# Patient Record
Sex: FEMALE | Race: WHITE | NOT HISPANIC OR LATINO
[De-identification: names, ages, dates, MRNs, and addresses within clinical notes are randomized per-mention and may not be internally consistent; named-entity substitution may affect disease eponyms.]

---

## 2017-01-19 ENCOUNTER — APPOINTMENT (OUTPATIENT)
Dept: OTOLARYNGOLOGY | Facility: CLINIC | Age: 57
End: 2017-01-19

## 2017-01-19 VITALS
HEART RATE: 101 BPM | DIASTOLIC BLOOD PRESSURE: 86 MMHG | WEIGHT: 190 LBS | HEIGHT: 66 IN | BODY MASS INDEX: 30.53 KG/M2 | SYSTOLIC BLOOD PRESSURE: 142 MMHG

## 2017-01-19 DIAGNOSIS — K11.20 SIALOADENITIS, UNSPECIFIED: ICD-10-CM

## 2017-02-03 ENCOUNTER — TRANSCRIPTION ENCOUNTER (OUTPATIENT)
Age: 57
End: 2017-02-03

## 2017-02-17 ENCOUNTER — APPOINTMENT (OUTPATIENT)
Dept: OTOLARYNGOLOGY | Facility: CLINIC | Age: 57
End: 2017-02-17

## 2017-02-17 VITALS
HEART RATE: 87 BPM | DIASTOLIC BLOOD PRESSURE: 85 MMHG | WEIGHT: 190 LBS | SYSTOLIC BLOOD PRESSURE: 150 MMHG | BODY MASS INDEX: 30.53 KG/M2 | HEIGHT: 66 IN

## 2017-02-17 DIAGNOSIS — R07.0 PAIN IN THROAT: ICD-10-CM

## 2017-02-17 DIAGNOSIS — H69.83 OTHER SPECIFIED DISORDERS OF EUSTACHIAN TUBE, BILATERAL: ICD-10-CM

## 2017-02-17 DIAGNOSIS — H92.01 OTALGIA, RIGHT EAR: ICD-10-CM

## 2017-02-17 DIAGNOSIS — R42 DIZZINESS AND GIDDINESS: ICD-10-CM

## 2017-02-18 ENCOUNTER — TRANSCRIPTION ENCOUNTER (OUTPATIENT)
Age: 57
End: 2017-02-18

## 2017-03-08 ENCOUNTER — LABORATORY RESULT (OUTPATIENT)
Age: 57
End: 2017-03-08

## 2017-03-10 ENCOUNTER — APPOINTMENT (OUTPATIENT)
Dept: MRI IMAGING | Facility: CLINIC | Age: 57
End: 2017-03-10

## 2017-03-10 ENCOUNTER — OUTPATIENT (OUTPATIENT)
Dept: OUTPATIENT SERVICES | Facility: HOSPITAL | Age: 57
LOS: 1 days | End: 2017-03-10
Payer: COMMERCIAL

## 2017-03-10 DIAGNOSIS — H81.49 VERTIGO OF CENTRAL ORIGIN, UNSPECIFIED EAR: ICD-10-CM

## 2017-03-10 PROCEDURE — 70553 MRI BRAIN STEM W/O & W/DYE: CPT

## 2017-03-10 PROCEDURE — A9585: CPT

## 2017-03-13 ENCOUNTER — TRANSCRIPTION ENCOUNTER (OUTPATIENT)
Age: 57
End: 2017-03-13

## 2017-03-17 ENCOUNTER — APPOINTMENT (OUTPATIENT)
Dept: OTOLARYNGOLOGY | Facility: CLINIC | Age: 57
End: 2017-03-17

## 2017-03-17 ENCOUNTER — TRANSCRIPTION ENCOUNTER (OUTPATIENT)
Age: 57
End: 2017-03-17

## 2017-03-23 ENCOUNTER — TRANSCRIPTION ENCOUNTER (OUTPATIENT)
Age: 57
End: 2017-03-23

## 2017-03-26 ENCOUNTER — FORM ENCOUNTER (OUTPATIENT)
Age: 57
End: 2017-03-26

## 2017-03-27 ENCOUNTER — OUTPATIENT (OUTPATIENT)
Dept: OUTPATIENT SERVICES | Facility: HOSPITAL | Age: 57
LOS: 1 days | End: 2017-03-27
Payer: COMMERCIAL

## 2017-03-27 DIAGNOSIS — J69.1 PNEUMONITIS DUE TO INHALATION OF OILS AND ESSENCES: ICD-10-CM

## 2017-03-27 PROCEDURE — 71020: CPT | Mod: 26

## 2017-03-27 PROCEDURE — 71046 X-RAY EXAM CHEST 2 VIEWS: CPT

## 2017-03-29 ENCOUNTER — OUTPATIENT (OUTPATIENT)
Dept: OUTPATIENT SERVICES | Facility: HOSPITAL | Age: 57
LOS: 1 days | Discharge: HOME | End: 2017-03-29

## 2017-03-29 DIAGNOSIS — N62 HYPERTROPHY OF BREAST: ICD-10-CM

## 2017-03-30 ENCOUNTER — APPOINTMENT (OUTPATIENT)
Dept: HEMATOLOGY ONCOLOGY | Facility: CLINIC | Age: 57
End: 2017-03-30

## 2017-03-30 VITALS
SYSTOLIC BLOOD PRESSURE: 120 MMHG | DIASTOLIC BLOOD PRESSURE: 80 MMHG | TEMPERATURE: 98 F | HEART RATE: 97 BPM | RESPIRATION RATE: 16 BRPM | OXYGEN SATURATION: 98 %

## 2017-03-30 DIAGNOSIS — E55.9 VITAMIN D DEFICIENCY, UNSPECIFIED: ICD-10-CM

## 2017-03-30 RX ORDER — IPRATROPIUM BROMIDE 21 UG/1
0.03 SPRAY NASAL
Qty: 30 | Refills: 0 | Status: DISCONTINUED | COMMUNITY
Start: 2017-02-03

## 2017-03-30 RX ORDER — CEPHALEXIN 500 MG/1
500 CAPSULE ORAL
Qty: 42 | Refills: 0 | Status: DISCONTINUED | COMMUNITY
Start: 2017-01-19

## 2017-03-30 RX ORDER — METHYLPREDNISOLONE 4 MG/1
4 TABLET ORAL
Qty: 1 | Refills: 0 | Status: DISCONTINUED | COMMUNITY
Start: 2017-02-17 | End: 2017-03-30

## 2017-03-30 RX ORDER — CEPHALEXIN 500 MG/1
500 TABLET ORAL 3 TIMES DAILY
Qty: 21 | Refills: 0 | Status: DISCONTINUED | COMMUNITY
Start: 2017-01-19 | End: 2017-03-30

## 2017-03-30 RX ORDER — TOBRAMYCIN AND DEXAMETHASONE 3; 1 MG/ML; MG/ML
0.3-0.1 SUSPENSION/ DROPS OPHTHALMIC
Qty: 5 | Refills: 0 | Status: DISCONTINUED | COMMUNITY
Start: 2017-02-18

## 2017-03-31 DIAGNOSIS — Z91.89 OTHER SPECIFIED PERSONAL RISK FACTORS, NOT ELSEWHERE CLASSIFIED: ICD-10-CM

## 2017-07-03 ENCOUNTER — OUTPATIENT (OUTPATIENT)
Dept: OUTPATIENT SERVICES | Facility: HOSPITAL | Age: 57
LOS: 1 days | End: 2017-07-03
Payer: COMMERCIAL

## 2017-07-03 ENCOUNTER — APPOINTMENT (OUTPATIENT)
Dept: ULTRASOUND IMAGING | Facility: CLINIC | Age: 57
End: 2017-07-03

## 2017-07-03 ENCOUNTER — APPOINTMENT (OUTPATIENT)
Dept: MAMMOGRAPHY | Facility: CLINIC | Age: 57
End: 2017-07-03

## 2017-07-03 DIAGNOSIS — N60.81 OTHER BENIGN MAMMARY DYSPLASIAS OF RIGHT BREAST: ICD-10-CM

## 2017-07-03 DIAGNOSIS — Z80.3 FAMILY HISTORY OF MALIGNANT NEOPLASM OF BREAST: ICD-10-CM

## 2017-07-03 DIAGNOSIS — Z00.8 ENCOUNTER FOR OTHER GENERAL EXAMINATION: ICD-10-CM

## 2017-07-03 PROCEDURE — 77067 SCR MAMMO BI INCL CAD: CPT

## 2017-07-03 PROCEDURE — 77063 BREAST TOMOSYNTHESIS BI: CPT

## 2017-07-03 PROCEDURE — 76641 ULTRASOUND BREAST COMPLETE: CPT

## 2017-08-14 ENCOUNTER — RESULT REVIEW (OUTPATIENT)
Age: 57
End: 2017-08-14

## 2017-09-20 ENCOUNTER — FORM ENCOUNTER (OUTPATIENT)
Age: 57
End: 2017-09-20

## 2017-09-21 ENCOUNTER — OUTPATIENT (OUTPATIENT)
Dept: OUTPATIENT SERVICES | Facility: HOSPITAL | Age: 57
LOS: 1 days | End: 2017-09-21
Payer: COMMERCIAL

## 2017-09-21 ENCOUNTER — OTHER (OUTPATIENT)
Age: 57
End: 2017-09-21

## 2017-09-21 DIAGNOSIS — Z91.89 OTHER SPECIFIED PERSONAL RISK FACTORS, NOT ELSEWHERE CLASSIFIED: ICD-10-CM

## 2017-09-21 PROCEDURE — 93971 EXTREMITY STUDY: CPT

## 2017-09-21 PROCEDURE — 93971 EXTREMITY STUDY: CPT | Mod: 26

## 2017-10-28 ENCOUNTER — TRANSCRIPTION ENCOUNTER (OUTPATIENT)
Age: 57
End: 2017-10-28

## 2017-11-08 ENCOUNTER — TRANSCRIPTION ENCOUNTER (OUTPATIENT)
Age: 57
End: 2017-11-08

## 2017-11-27 ENCOUNTER — APPOINTMENT (OUTPATIENT)
Dept: MRI IMAGING | Facility: CLINIC | Age: 57
End: 2017-11-27
Payer: COMMERCIAL

## 2017-11-27 ENCOUNTER — OUTPATIENT (OUTPATIENT)
Dept: OUTPATIENT SERVICES | Facility: HOSPITAL | Age: 57
LOS: 1 days | End: 2017-11-27
Payer: COMMERCIAL

## 2017-11-27 DIAGNOSIS — Z00.8 ENCOUNTER FOR OTHER GENERAL EXAMINATION: ICD-10-CM

## 2017-11-27 PROCEDURE — 73718 MRI LOWER EXTREMITY W/O DYE: CPT

## 2017-11-27 PROCEDURE — 73718 MRI LOWER EXTREMITY W/O DYE: CPT | Mod: 26,LT

## 2017-12-15 ENCOUNTER — OUTPATIENT (OUTPATIENT)
Dept: OUTPATIENT SERVICES | Facility: HOSPITAL | Age: 57
LOS: 1 days | Discharge: HOME | End: 2017-12-15

## 2017-12-15 DIAGNOSIS — Z91.89 OTHER SPECIFIED PERSONAL RISK FACTORS, NOT ELSEWHERE CLASSIFIED: ICD-10-CM

## 2017-12-18 ENCOUNTER — LABORATORY RESULT (OUTPATIENT)
Age: 57
End: 2017-12-18

## 2017-12-31 ENCOUNTER — TRANSCRIPTION ENCOUNTER (OUTPATIENT)
Age: 57
End: 2017-12-31

## 2018-01-03 ENCOUNTER — OUTPATIENT (OUTPATIENT)
Dept: OUTPATIENT SERVICES | Facility: HOSPITAL | Age: 58
LOS: 1 days | End: 2018-01-03
Payer: COMMERCIAL

## 2018-01-03 ENCOUNTER — APPOINTMENT (OUTPATIENT)
Dept: MRI IMAGING | Facility: CLINIC | Age: 58
End: 2018-01-03
Payer: COMMERCIAL

## 2018-01-03 DIAGNOSIS — Z00.8 ENCOUNTER FOR OTHER GENERAL EXAMINATION: ICD-10-CM

## 2018-01-03 PROCEDURE — C8937: CPT

## 2018-01-03 PROCEDURE — 0159T: CPT | Mod: 26

## 2018-01-03 PROCEDURE — C8908: CPT

## 2018-01-03 PROCEDURE — 77059 MRI BREAST BILATERAL: CPT | Mod: 26

## 2018-01-03 PROCEDURE — A9585: CPT

## 2018-01-30 ENCOUNTER — OUTPATIENT (OUTPATIENT)
Dept: OUTPATIENT SERVICES | Facility: HOSPITAL | Age: 58
LOS: 1 days | Discharge: HOME | End: 2018-01-30

## 2018-01-30 DIAGNOSIS — Z91.89 OTHER SPECIFIED PERSONAL RISK FACTORS, NOT ELSEWHERE CLASSIFIED: ICD-10-CM

## 2018-02-02 ENCOUNTER — APPOINTMENT (OUTPATIENT)
Dept: HEMATOLOGY ONCOLOGY | Facility: CLINIC | Age: 58
End: 2018-02-02
Payer: COMMERCIAL

## 2018-02-02 VITALS
TEMPERATURE: 98.2 F | SYSTOLIC BLOOD PRESSURE: 129 MMHG | OXYGEN SATURATION: 95 % | HEART RATE: 90 BPM | RESPIRATION RATE: 16 BRPM | DIASTOLIC BLOOD PRESSURE: 84 MMHG

## 2018-02-02 PROCEDURE — 99214 OFFICE O/P EST MOD 30 MIN: CPT

## 2018-02-02 RX ORDER — TRIAMCINOLONE ACETONIDE 1 MG/G
0.1 CREAM TOPICAL
Qty: 80 | Refills: 0 | Status: DISCONTINUED | COMMUNITY
Start: 2017-09-05

## 2018-02-02 RX ORDER — MELOXICAM 15 MG/1
15 TABLET ORAL
Qty: 30 | Refills: 0 | Status: DISCONTINUED | COMMUNITY
Start: 2017-12-01

## 2018-02-02 RX ORDER — NYSTATIN 100000 1/G
100000 POWDER TOPICAL
Qty: 15 | Refills: 0 | Status: DISCONTINUED | COMMUNITY
Start: 2018-01-08

## 2018-02-02 RX ORDER — DILTIAZEM HYDROCHLORIDE 120 MG/1
120 CAPSULE, EXTENDED RELEASE ORAL
Qty: 90 | Refills: 0 | Status: DISCONTINUED | COMMUNITY
Start: 2017-03-09

## 2018-02-25 ENCOUNTER — TRANSCRIPTION ENCOUNTER (OUTPATIENT)
Age: 58
End: 2018-02-25

## 2018-07-05 ENCOUNTER — OUTPATIENT (OUTPATIENT)
Dept: OUTPATIENT SERVICES | Facility: HOSPITAL | Age: 58
LOS: 1 days | End: 2018-07-05
Payer: COMMERCIAL

## 2018-07-05 ENCOUNTER — APPOINTMENT (OUTPATIENT)
Dept: MAMMOGRAPHY | Facility: CLINIC | Age: 58
End: 2018-07-05
Payer: COMMERCIAL

## 2018-07-05 ENCOUNTER — APPOINTMENT (OUTPATIENT)
Dept: ULTRASOUND IMAGING | Facility: CLINIC | Age: 58
End: 2018-07-05
Payer: COMMERCIAL

## 2018-07-05 DIAGNOSIS — N60.81 OTHER BENIGN MAMMARY DYSPLASIAS OF RIGHT BREAST: ICD-10-CM

## 2018-07-05 DIAGNOSIS — Z80.3 FAMILY HISTORY OF MALIGNANT NEOPLASM OF BREAST: ICD-10-CM

## 2018-07-05 PROCEDURE — 76641 ULTRASOUND BREAST COMPLETE: CPT | Mod: 26,50

## 2018-07-05 PROCEDURE — 77063 BREAST TOMOSYNTHESIS BI: CPT

## 2018-07-05 PROCEDURE — 77067 SCR MAMMO BI INCL CAD: CPT

## 2018-07-05 PROCEDURE — 76641 ULTRASOUND BREAST COMPLETE: CPT

## 2018-07-05 PROCEDURE — 77067 SCR MAMMO BI INCL CAD: CPT | Mod: 26

## 2018-07-05 PROCEDURE — 77063 BREAST TOMOSYNTHESIS BI: CPT | Mod: 26

## 2018-07-25 ENCOUNTER — RX RENEWAL (OUTPATIENT)
Age: 58
End: 2018-07-25

## 2018-08-21 ENCOUNTER — RESULT REVIEW (OUTPATIENT)
Age: 58
End: 2018-08-21

## 2018-08-31 ENCOUNTER — APPOINTMENT (OUTPATIENT)
Dept: DERMATOLOGY | Facility: CLINIC | Age: 58
End: 2018-08-31

## 2018-10-08 ENCOUNTER — OUTPATIENT (OUTPATIENT)
Dept: OUTPATIENT SERVICES | Facility: HOSPITAL | Age: 58
LOS: 1 days | End: 2018-10-08
Payer: COMMERCIAL

## 2018-10-08 VITALS
HEART RATE: 71 BPM | RESPIRATION RATE: 14 BRPM | SYSTOLIC BLOOD PRESSURE: 134 MMHG | TEMPERATURE: 97 F | OXYGEN SATURATION: 98 % | HEIGHT: 66 IN | DIASTOLIC BLOOD PRESSURE: 85 MMHG | WEIGHT: 184.09 LBS

## 2018-10-08 DIAGNOSIS — Z80.3 FAMILY HISTORY OF MALIGNANT NEOPLASM OF BREAST: ICD-10-CM

## 2018-10-08 DIAGNOSIS — Z98.890 OTHER SPECIFIED POSTPROCEDURAL STATES: Chronic | ICD-10-CM

## 2018-10-08 DIAGNOSIS — M67.442 GANGLION, LEFT HAND: ICD-10-CM

## 2018-10-08 DIAGNOSIS — Z87.59 PERSONAL HISTORY OF OTHER COMPLICATIONS OF PREGNANCY, CHILDBIRTH AND THE PUERPERIUM: Chronic | ICD-10-CM

## 2018-10-08 DIAGNOSIS — Z98.891 HISTORY OF UTERINE SCAR FROM PREVIOUS SURGERY: Chronic | ICD-10-CM

## 2018-10-08 DIAGNOSIS — Z01.818 ENCOUNTER FOR OTHER PREPROCEDURAL EXAMINATION: ICD-10-CM

## 2018-10-08 DIAGNOSIS — E03.9 HYPOTHYROIDISM, UNSPECIFIED: ICD-10-CM

## 2018-10-08 DIAGNOSIS — M15.1 HEBERDEN'S NODES (WITH ARTHROPATHY): ICD-10-CM

## 2018-10-08 LAB
HCT VFR BLD CALC: 43.5 % — SIGNIFICANT CHANGE UP (ref 34.5–45)
HCV AB S/CO SERPL IA: 0.14 S/CO — SIGNIFICANT CHANGE UP
HCV AB SERPL-IMP: SIGNIFICANT CHANGE UP
HGB BLD-MCNC: 13.5 G/DL — SIGNIFICANT CHANGE UP (ref 11.5–15.5)
MCHC RBC-ENTMCNC: 27.1 PG — SIGNIFICANT CHANGE UP (ref 27–34)
MCHC RBC-ENTMCNC: 31 GM/DL — LOW (ref 32–36)
MCV RBC AUTO: 87.3 FL — SIGNIFICANT CHANGE UP (ref 80–100)
PLATELET # BLD AUTO: 249 K/UL — SIGNIFICANT CHANGE UP (ref 150–400)
RBC # BLD: 4.98 M/UL — SIGNIFICANT CHANGE UP (ref 3.8–5.2)
RBC # FLD: 13.5 % — SIGNIFICANT CHANGE UP (ref 10.3–14.5)
WBC # BLD: 7.16 K/UL — SIGNIFICANT CHANGE UP (ref 3.8–10.5)
WBC # FLD AUTO: 7.16 K/UL — SIGNIFICANT CHANGE UP (ref 3.8–10.5)

## 2018-10-08 PROCEDURE — 85027 COMPLETE CBC AUTOMATED: CPT

## 2018-10-08 PROCEDURE — 86803 HEPATITIS C AB TEST: CPT

## 2018-10-08 PROCEDURE — G0463: CPT

## 2018-10-08 RX ORDER — LIDOCAINE HCL 20 MG/ML
0.2 VIAL (ML) INJECTION ONCE
Qty: 0 | Refills: 0 | Status: DISCONTINUED | OUTPATIENT
Start: 2018-10-10 | End: 2018-10-25

## 2018-10-08 RX ORDER — SODIUM CHLORIDE 9 MG/ML
3 INJECTION INTRAMUSCULAR; INTRAVENOUS; SUBCUTANEOUS EVERY 8 HOURS
Qty: 0 | Refills: 0 | Status: DISCONTINUED | OUTPATIENT
Start: 2018-10-10 | End: 2018-10-25

## 2018-10-08 NOTE — H&P PST ADULT - HISTORY OF PRESENT ILLNESS
Ms. 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

## 2018-10-08 NOTE — H&P PST ADULT - ATTENDING COMMENTS
The patient is admitted today for excision of left III mucous cyst with DIP debridement.  I have reviewed the procedure in detail again today with the patient.  The numerous risks, benefits, alternatives, possible complications and expectations are reviewed.  All questions have been thoroughly answered and the patient has verbalized understanding of all of the above and gives consent to proceed

## 2018-10-08 NOTE — H&P PST ADULT - PMH
Family history of breast cancer in first degree relative    Hypothyroidism, unspecified type    Palpitations  when she was started on raloxifene years ago

## 2018-10-08 NOTE — H&P PST ADULT - NSANTHOSAYNRD_GEN_A_CORE
No. GARRETT screening performed.  STOP BANG Legend: 0-2 = LOW Risk; 3-4 = INTERMEDIATE Risk; 5-8 = HIGH Risk

## 2018-10-08 NOTE — H&P PST ADULT - PROBLEM SELECTOR PLAN 1
Scheduled for excision of left middle finger mucous cyst, distal interphalangeal joint debridement, arthroscopy.

## 2018-10-09 ENCOUNTER — TRANSCRIPTION ENCOUNTER (OUTPATIENT)
Age: 58
End: 2018-10-09

## 2018-10-09 RX ORDER — ONDANSETRON 8 MG/1
4 TABLET, FILM COATED ORAL ONCE
Qty: 0 | Refills: 0 | Status: DISCONTINUED | OUTPATIENT
Start: 2018-10-10 | End: 2018-10-25

## 2018-10-09 RX ORDER — SODIUM CHLORIDE 9 MG/ML
1000 INJECTION, SOLUTION INTRAVENOUS
Qty: 0 | Refills: 0 | Status: DISCONTINUED | OUTPATIENT
Start: 2018-10-10 | End: 2018-10-25

## 2018-10-09 RX ORDER — CELECOXIB 200 MG/1
200 CAPSULE ORAL ONCE
Qty: 0 | Refills: 0 | Status: DISCONTINUED | OUTPATIENT
Start: 2018-10-10 | End: 2018-10-25

## 2018-10-10 ENCOUNTER — RESULT REVIEW (OUTPATIENT)
Age: 58
End: 2018-10-10

## 2018-10-10 ENCOUNTER — OUTPATIENT (OUTPATIENT)
Dept: OUTPATIENT SERVICES | Facility: HOSPITAL | Age: 58
LOS: 1 days | End: 2018-10-10
Payer: COMMERCIAL

## 2018-10-10 VITALS
HEART RATE: 70 BPM | RESPIRATION RATE: 16 BRPM | SYSTOLIC BLOOD PRESSURE: 133 MMHG | DIASTOLIC BLOOD PRESSURE: 78 MMHG | OXYGEN SATURATION: 98 %

## 2018-10-10 VITALS
WEIGHT: 184.09 LBS | RESPIRATION RATE: 14 BRPM | HEIGHT: 66 IN | SYSTOLIC BLOOD PRESSURE: 146 MMHG | TEMPERATURE: 99 F | HEART RATE: 83 BPM | DIASTOLIC BLOOD PRESSURE: 85 MMHG | OXYGEN SATURATION: 98 %

## 2018-10-10 DIAGNOSIS — Z98.891 HISTORY OF UTERINE SCAR FROM PREVIOUS SURGERY: Chronic | ICD-10-CM

## 2018-10-10 DIAGNOSIS — M67.442 GANGLION, LEFT HAND: ICD-10-CM

## 2018-10-10 DIAGNOSIS — Z98.890 OTHER SPECIFIED POSTPROCEDURAL STATES: Chronic | ICD-10-CM

## 2018-10-10 DIAGNOSIS — M15.1 HEBERDEN'S NODES (WITH ARTHROPATHY): ICD-10-CM

## 2018-10-10 DIAGNOSIS — Z87.59 PERSONAL HISTORY OF OTHER COMPLICATIONS OF PREGNANCY, CHILDBIRTH AND THE PUERPERIUM: Chronic | ICD-10-CM

## 2018-10-10 PROCEDURE — 88304 TISSUE EXAM BY PATHOLOGIST: CPT

## 2018-10-10 PROCEDURE — 88304 TISSUE EXAM BY PATHOLOGIST: CPT | Mod: 26

## 2018-10-10 PROCEDURE — 26535 REVISE FINGER JOINT: CPT | Mod: F2

## 2018-10-10 RX ORDER — CELECOXIB 200 MG/1
200 CAPSULE ORAL ONCE
Qty: 0 | Refills: 0 | Status: COMPLETED | OUTPATIENT
Start: 2018-10-10 | End: 2018-10-10

## 2018-10-10 RX ORDER — ACETAMINOPHEN 500 MG
1000 TABLET ORAL ONCE
Qty: 0 | Refills: 0 | Status: COMPLETED | OUTPATIENT
Start: 2018-10-10 | End: 2018-10-10

## 2018-10-10 RX ADMIN — CELECOXIB 200 MILLIGRAM(S): 200 CAPSULE ORAL at 11:00

## 2018-10-10 RX ADMIN — Medication 1000 MILLIGRAM(S): at 11:00

## 2018-10-10 NOTE — BRIEF OPERATIVE NOTE - PRE-OP DX
Mucous cyst of digit of left hand  10/10/2018    Active  Rachele Courtney  Osteophyte of hand, unspecified laterality  10/10/2018    Active  Rachele Courtney

## 2018-10-10 NOTE — ASU DISCHARGE PLAN (ADULT/PEDIATRIC). - ITEMS TO FOLLOWUP WITH YOUR PHYSICIAN'S
Please follow up with Dr. Fatima within 1-2 weeks after discharge from the hospital. You may call [phone] to schedule an appointment.

## 2018-10-10 NOTE — ASU DISCHARGE PLAN (ADULT/PEDIATRIC). - SPECIAL INSTRUCTIONS
Please dressing in place until Saturday; then you may remove the dressing and replace with a bandaid.  Keep hand dry while bathing; you may place it in a plastic bag to keep it from getting wet while showering.  Keep hand elevated to reduce swelling.  Take tylenol, as needed, for pain,

## 2018-10-10 NOTE — BRIEF OPERATIVE NOTE - PROCEDURE
<<-----Click on this checkbox to enter Procedure Debridement of joint  10/10/2018    Active  DBREGMAN  Excision of mucous cyst  10/10/2018    Active  DBREGMAN

## 2018-10-10 NOTE — ASU DISCHARGE PLAN (ADULT/PEDIATRIC). - MEDICATION SUMMARY - MEDICATIONS TO TAKE
I will START or STAY ON the medications listed below when I get home from the hospital:    acetaminophen 325 mg oral tablet  -- 2 tab(s) by mouth every 4 hours, As Needed - for moderate pain  -- Indication: For For pain    Cardizem 120 mg oral tablet  -- 1 tab(s) by mouth once a day  -- Indication: For Home medication    raloxifene 60 mg oral tablet  -- 1 tab(s) by mouth once a day  -- Indication: For Home medication    Synthroid 150 mcg (0.15 mg) oral tablet  -- 1 tab(s) by mouth once a day  -- Indication: For Home medication

## 2018-10-10 NOTE — BRIEF OPERATIVE NOTE - POST-OP DX
Mucous cyst of digit of left hand  10/10/2018    Active  Rachele Courtney  Osteophyte of left hand  10/10/2018    Active  Rachele Courtney

## 2018-10-17 LAB — SURGICAL PATHOLOGY STUDY: SIGNIFICANT CHANGE UP

## 2018-10-18 PROBLEM — R00.2 PALPITATIONS: Chronic | Status: ACTIVE | Noted: 2018-10-08

## 2018-10-18 PROBLEM — E03.9 HYPOTHYROIDISM, UNSPECIFIED: Chronic | Status: ACTIVE | Noted: 2018-10-08

## 2018-10-18 PROBLEM — Z80.3 FAMILY HISTORY OF MALIGNANT NEOPLASM OF BREAST: Chronic | Status: ACTIVE | Noted: 2018-10-08

## 2018-10-31 ENCOUNTER — NON-APPOINTMENT (OUTPATIENT)
Age: 58
End: 2018-10-31

## 2018-10-31 ENCOUNTER — APPOINTMENT (OUTPATIENT)
Dept: CARDIOLOGY | Facility: CLINIC | Age: 58
End: 2018-10-31
Payer: COMMERCIAL

## 2018-10-31 VITALS
WEIGHT: 181 LBS | BODY MASS INDEX: 29.09 KG/M2 | HEART RATE: 77 BPM | HEIGHT: 66 IN | SYSTOLIC BLOOD PRESSURE: 129 MMHG | OXYGEN SATURATION: 96 % | DIASTOLIC BLOOD PRESSURE: 84 MMHG

## 2018-10-31 PROCEDURE — 99244 OFF/OP CNSLTJ NEW/EST MOD 40: CPT | Mod: 25

## 2018-10-31 PROCEDURE — 99401 PREV MED CNSL INDIV APPRX 15: CPT

## 2018-10-31 PROCEDURE — 93025 MICROVOLT T-WAVE ASSESS: CPT

## 2018-11-01 LAB
ALBUMIN SERPL ELPH-MCNC: 4.3 G/DL
ALP BLD-CCNC: 86 U/L
ALT SERPL-CCNC: 18 U/L
ANION GAP SERPL CALC-SCNC: 14 MMOL/L
AST SERPL-CCNC: 22 U/L
BASOPHILS # BLD AUTO: 0.03 K/UL
BASOPHILS NFR BLD AUTO: 0.4 %
BILIRUB SERPL-MCNC: 0.5 MG/DL
BUN SERPL-MCNC: 9 MG/DL
CALCIUM SERPL-MCNC: 9.5 MG/DL
CHLORIDE SERPL-SCNC: 101 MMOL/L
CHOLEST SERPL-MCNC: 208 MG/DL
CHOLEST/HDLC SERPL: 2.8 RATIO
CO2 SERPL-SCNC: 26 MMOL/L
CREAT SERPL-MCNC: 0.54 MG/DL
EOSINOPHIL # BLD AUTO: 0.17 K/UL
EOSINOPHIL NFR BLD AUTO: 2.4 %
GLUCOSE SERPL-MCNC: 91 MG/DL
HCT VFR BLD CALC: 42.6 %
HDLC SERPL-MCNC: 75 MG/DL
HGB BLD-MCNC: 13.7 G/DL
IMM GRANULOCYTES NFR BLD AUTO: 0.1 %
LDLC SERPL CALC-MCNC: 110 MG/DL
LYMPHOCYTES # BLD AUTO: 2.87 K/UL
LYMPHOCYTES NFR BLD AUTO: 41 %
MAN DIFF?: NORMAL
MCHC RBC-ENTMCNC: 27 PG
MCHC RBC-ENTMCNC: 32.2 GM/DL
MCV RBC AUTO: 83.9 FL
MONOCYTES # BLD AUTO: 0.57 K/UL
MONOCYTES NFR BLD AUTO: 8.1 %
NEUTROPHILS # BLD AUTO: 3.35 K/UL
NEUTROPHILS NFR BLD AUTO: 48 %
PLATELET # BLD AUTO: 240 K/UL
POTASSIUM SERPL-SCNC: 4.5 MMOL/L
PROT SERPL-MCNC: 7.2 G/DL
RBC # BLD: 5.08 M/UL
RBC # FLD: 13.3 %
SODIUM SERPL-SCNC: 141 MMOL/L
TRIGL SERPL-MCNC: 114 MG/DL
TSH SERPL-ACNC: 0.12 UIU/ML
WBC # FLD AUTO: 7 K/UL

## 2018-11-04 ENCOUNTER — TRANSCRIPTION ENCOUNTER (OUTPATIENT)
Age: 58
End: 2018-11-04

## 2018-12-21 ENCOUNTER — APPOINTMENT (OUTPATIENT)
Dept: MRI IMAGING | Facility: IMAGING CENTER | Age: 58
End: 2018-12-21
Payer: COMMERCIAL

## 2018-12-21 ENCOUNTER — OUTPATIENT (OUTPATIENT)
Dept: OUTPATIENT SERVICES | Facility: HOSPITAL | Age: 58
LOS: 1 days | End: 2018-12-21
Payer: COMMERCIAL

## 2018-12-21 DIAGNOSIS — Z98.890 OTHER SPECIFIED POSTPROCEDURAL STATES: Chronic | ICD-10-CM

## 2018-12-21 DIAGNOSIS — Z00.8 ENCOUNTER FOR OTHER GENERAL EXAMINATION: ICD-10-CM

## 2018-12-21 DIAGNOSIS — Z98.891 HISTORY OF UTERINE SCAR FROM PREVIOUS SURGERY: Chronic | ICD-10-CM

## 2018-12-21 DIAGNOSIS — Z87.59 PERSONAL HISTORY OF OTHER COMPLICATIONS OF PREGNANCY, CHILDBIRTH AND THE PUERPERIUM: Chronic | ICD-10-CM

## 2018-12-21 PROCEDURE — 77059 MRI BREAST BILATERAL: CPT | Mod: 26

## 2018-12-21 PROCEDURE — 0159T: CPT | Mod: 26

## 2018-12-21 PROCEDURE — C8908: CPT

## 2018-12-21 PROCEDURE — A9585: CPT

## 2018-12-21 PROCEDURE — C8937: CPT

## 2018-12-21 PROCEDURE — 82565 ASSAY OF CREATININE: CPT

## 2019-02-01 ENCOUNTER — APPOINTMENT (OUTPATIENT)
Dept: SURGICAL ONCOLOGY | Facility: CLINIC | Age: 59
End: 2019-02-01
Payer: COMMERCIAL

## 2019-02-01 VITALS
HEART RATE: 78 BPM | OXYGEN SATURATION: 97 % | HEIGHT: 66 IN | SYSTOLIC BLOOD PRESSURE: 144 MMHG | WEIGHT: 178 LBS | DIASTOLIC BLOOD PRESSURE: 86 MMHG | BODY MASS INDEX: 28.61 KG/M2

## 2019-02-01 PROCEDURE — 99243 OFF/OP CNSLTJ NEW/EST LOW 30: CPT

## 2019-02-01 NOTE — CONSULT LETTER
[Dear  ___] : Dear  [unfilled], [Consult Letter:] : I had the pleasure of evaluating your patient, [unfilled]. [Please see my note below.] : Please see my note below. [Consult Closing:] : Thank you very much for allowing me to participate in the care of this patient.  If you have any questions, please do not hesitate to contact me. [Sincerely,] : Sincerely, [FreeTextEntry2] : [address]\[place] [address] [FreeTextEntry3] : Huseyin Catalan M.D.\par \par \par \par  - Singulair 10 mg daily - c/w home Protonix 40 mg PO daily

## 2019-02-01 NOTE — HISTORY OF PRESENT ILLNESS
[de-identified] : 59 y/o female patient presents for an initial consultation. She denies any palpable masses and nipple discharge.\par FMHx of recurrent Breast CA from mother.\par PMHx of breast disease includes LT breast excision and RT breast ALH.\par Pt has no gene mutation. \par She has a hx of HTN controlled with medication and hypothyroidism.\par NKDA\par \par MRI 02/01/19: No MRI evidence of malignancy. BIRADS 2.

## 2019-02-01 NOTE — PHYSICAL EXAM
[Normal] : supple, no neck mass and thyroid not enlarged [Normal Neck Lymph Nodes] : normal neck lymph nodes  [Normal Groin Lymph Nodes] : normal groin lymph nodes [Normal] : oriented to person, place and time, with appropriate affect [FreeTextEntry1] : I, Julio Judd was present for the physical exam\par \par \par \par  [de-identified] : Normal S1, S2. Regular rate and rhythm\par \par  [de-identified] : Complete normal breast examination performed supine and upright revealed no palpable masses, nipple discharge, inversion, deviation or enlarge axillary lymph nodes, or supraclavicular lymph nodes [de-identified] : Clear breath sounds bilaterally, normal respiratory effort\par \par

## 2019-02-01 NOTE — ASSESSMENT
[FreeTextEntry1] : imp:\par High risk patient who needs yearly surveillance.\par No evidence of new or recurrent lesions. Breast imaging failed to identify any suspicious lesions. No suspicious lesions on exam\par plan:\par Continue yearly surveillance.\par \par \par

## 2019-02-05 ENCOUNTER — RX RENEWAL (OUTPATIENT)
Age: 59
End: 2019-02-05

## 2019-03-01 ENCOUNTER — TRANSCRIPTION ENCOUNTER (OUTPATIENT)
Age: 59
End: 2019-03-01

## 2019-03-21 ENCOUNTER — TRANSCRIPTION ENCOUNTER (OUTPATIENT)
Age: 59
End: 2019-03-21

## 2019-04-17 ENCOUNTER — APPOINTMENT (OUTPATIENT)
Dept: CARDIOLOGY | Facility: CLINIC | Age: 59
End: 2019-04-17
Payer: COMMERCIAL

## 2019-04-17 ENCOUNTER — NON-APPOINTMENT (OUTPATIENT)
Age: 59
End: 2019-04-17

## 2019-04-17 VITALS
SYSTOLIC BLOOD PRESSURE: 136 MMHG | HEIGHT: 66 IN | WEIGHT: 165 LBS | DIASTOLIC BLOOD PRESSURE: 86 MMHG | BODY MASS INDEX: 26.52 KG/M2 | OXYGEN SATURATION: 96 % | HEART RATE: 74 BPM

## 2019-04-17 LAB
ALBUMIN SERPL ELPH-MCNC: 4.4 G/DL
ALP BLD-CCNC: 83 U/L
ALT SERPL-CCNC: 16 U/L
ANION GAP SERPL CALC-SCNC: 14 MMOL/L
AST SERPL-CCNC: 17 U/L
BASOPHILS # BLD AUTO: 0.05 K/UL
BASOPHILS NFR BLD AUTO: 0.6 %
BILIRUB SERPL-MCNC: 0.4 MG/DL
BUN SERPL-MCNC: 13 MG/DL
CALCIUM SERPL-MCNC: 9.2 MG/DL
CHLORIDE SERPL-SCNC: 101 MMOL/L
CHOLEST SERPL-MCNC: 218 MG/DL
CHOLEST/HDLC SERPL: 2.5 RATIO
CO2 SERPL-SCNC: 25 MMOL/L
CREAT SERPL-MCNC: 0.6 MG/DL
EOSINOPHIL # BLD AUTO: 0.28 K/UL
EOSINOPHIL NFR BLD AUTO: 3.6 %
ESTIMATED AVERAGE GLUCOSE: 111 MG/DL
GLUCOSE SERPL-MCNC: 91 MG/DL
HBA1C MFR BLD HPLC: 5.5 %
HCT VFR BLD CALC: 43.6 %
HDLC SERPL-MCNC: 89 MG/DL
HGB BLD-MCNC: 13.9 G/DL
IMM GRANULOCYTES NFR BLD AUTO: 0.1 %
LDLC SERPL CALC-MCNC: 112 MG/DL
LYMPHOCYTES # BLD AUTO: 2.63 K/UL
LYMPHOCYTES NFR BLD AUTO: 33.7 %
MAN DIFF?: NORMAL
MCHC RBC-ENTMCNC: 27.6 PG
MCHC RBC-ENTMCNC: 31.9 GM/DL
MCV RBC AUTO: 86.5 FL
MONOCYTES # BLD AUTO: 0.54 K/UL
MONOCYTES NFR BLD AUTO: 6.9 %
NEUTROPHILS # BLD AUTO: 4.3 K/UL
NEUTROPHILS NFR BLD AUTO: 55.1 %
PLATELET # BLD AUTO: 265 K/UL
POTASSIUM SERPL-SCNC: 4.4 MMOL/L
PROT SERPL-MCNC: 6.9 G/DL
RBC # BLD: 5.04 M/UL
RBC # FLD: 13 %
SODIUM SERPL-SCNC: 140 MMOL/L
TRIGL SERPL-MCNC: 87 MG/DL
TSH SERPL-ACNC: 0.22 UIU/ML
WBC # FLD AUTO: 7.81 K/UL

## 2019-04-17 PROCEDURE — 99402 PREV MED CNSL INDIV APPRX 30: CPT

## 2019-04-17 PROCEDURE — 99214 OFFICE O/P EST MOD 30 MIN: CPT | Mod: 25

## 2019-04-17 PROCEDURE — 93000 ELECTROCARDIOGRAM COMPLETE: CPT

## 2019-04-22 ENCOUNTER — OUTPATIENT (OUTPATIENT)
Dept: OUTPATIENT SERVICES | Facility: HOSPITAL | Age: 59
LOS: 1 days | Discharge: HOME | End: 2019-04-22

## 2019-04-22 DIAGNOSIS — Z91.89 OTHER SPECIFIED PERSONAL RISK FACTORS, NOT ELSEWHERE CLASSIFIED: ICD-10-CM

## 2019-04-22 DIAGNOSIS — Z87.59 PERSONAL HISTORY OF OTHER COMPLICATIONS OF PREGNANCY, CHILDBIRTH AND THE PUERPERIUM: Chronic | ICD-10-CM

## 2019-04-22 DIAGNOSIS — Z98.890 OTHER SPECIFIED POSTPROCEDURAL STATES: Chronic | ICD-10-CM

## 2019-04-22 DIAGNOSIS — Z98.891 HISTORY OF UTERINE SCAR FROM PREVIOUS SURGERY: Chronic | ICD-10-CM

## 2019-04-26 ENCOUNTER — APPOINTMENT (OUTPATIENT)
Dept: HEMATOLOGY ONCOLOGY | Facility: CLINIC | Age: 59
End: 2019-04-26
Payer: COMMERCIAL

## 2019-04-26 VITALS
RESPIRATION RATE: 16 BRPM | TEMPERATURE: 97.4 F | DIASTOLIC BLOOD PRESSURE: 83 MMHG | SYSTOLIC BLOOD PRESSURE: 134 MMHG | HEART RATE: 76 BPM | WEIGHT: 164 LBS | OXYGEN SATURATION: 97 % | BODY MASS INDEX: 26.47 KG/M2

## 2019-04-26 DIAGNOSIS — E61.1 IRON DEFICIENCY: ICD-10-CM

## 2019-04-26 PROCEDURE — 99214 OFFICE O/P EST MOD 30 MIN: CPT

## 2019-05-18 ENCOUNTER — RX RENEWAL (OUTPATIENT)
Age: 59
End: 2019-05-18

## 2019-07-11 ENCOUNTER — FORM ENCOUNTER (OUTPATIENT)
Age: 59
End: 2019-07-11

## 2019-07-12 ENCOUNTER — OUTPATIENT (OUTPATIENT)
Dept: OUTPATIENT SERVICES | Facility: HOSPITAL | Age: 59
LOS: 1 days | End: 2019-07-12
Payer: COMMERCIAL

## 2019-07-12 ENCOUNTER — APPOINTMENT (OUTPATIENT)
Dept: MAMMOGRAPHY | Facility: IMAGING CENTER | Age: 59
End: 2019-07-12
Payer: COMMERCIAL

## 2019-07-12 ENCOUNTER — APPOINTMENT (OUTPATIENT)
Dept: ULTRASOUND IMAGING | Facility: IMAGING CENTER | Age: 59
End: 2019-07-12
Payer: COMMERCIAL

## 2019-07-12 DIAGNOSIS — Z98.890 OTHER SPECIFIED POSTPROCEDURAL STATES: Chronic | ICD-10-CM

## 2019-07-12 DIAGNOSIS — Z98.891 HISTORY OF UTERINE SCAR FROM PREVIOUS SURGERY: Chronic | ICD-10-CM

## 2019-07-12 DIAGNOSIS — N60.99 UNSPECIFIED BENIGN MAMMARY DYSPLASIA OF UNSPECIFIED BREAST: ICD-10-CM

## 2019-07-12 DIAGNOSIS — Z87.59 PERSONAL HISTORY OF OTHER COMPLICATIONS OF PREGNANCY, CHILDBIRTH AND THE PUERPERIUM: Chronic | ICD-10-CM

## 2019-07-12 PROCEDURE — 76641 ULTRASOUND BREAST COMPLETE: CPT | Mod: 26,50

## 2019-07-12 PROCEDURE — 77063 BREAST TOMOSYNTHESIS BI: CPT

## 2019-07-12 PROCEDURE — 77063 BREAST TOMOSYNTHESIS BI: CPT | Mod: 26

## 2019-07-12 PROCEDURE — 77067 SCR MAMMO BI INCL CAD: CPT | Mod: 26

## 2019-07-12 PROCEDURE — 77067 SCR MAMMO BI INCL CAD: CPT

## 2019-07-12 PROCEDURE — 76641 ULTRASOUND BREAST COMPLETE: CPT

## 2019-07-15 ENCOUNTER — FORM ENCOUNTER (OUTPATIENT)
Age: 59
End: 2019-07-15

## 2019-07-16 ENCOUNTER — APPOINTMENT (OUTPATIENT)
Dept: ULTRASOUND IMAGING | Facility: IMAGING CENTER | Age: 59
End: 2019-07-16
Payer: COMMERCIAL

## 2019-07-16 ENCOUNTER — APPOINTMENT (OUTPATIENT)
Dept: MAMMOGRAPHY | Facility: IMAGING CENTER | Age: 59
End: 2019-07-16
Payer: COMMERCIAL

## 2019-07-16 ENCOUNTER — OUTPATIENT (OUTPATIENT)
Dept: OUTPATIENT SERVICES | Facility: HOSPITAL | Age: 59
LOS: 1 days | End: 2019-07-16
Payer: COMMERCIAL

## 2019-07-16 DIAGNOSIS — Z00.8 ENCOUNTER FOR OTHER GENERAL EXAMINATION: ICD-10-CM

## 2019-07-16 DIAGNOSIS — Z98.890 OTHER SPECIFIED POSTPROCEDURAL STATES: Chronic | ICD-10-CM

## 2019-07-16 DIAGNOSIS — Z98.891 HISTORY OF UTERINE SCAR FROM PREVIOUS SURGERY: Chronic | ICD-10-CM

## 2019-07-16 DIAGNOSIS — Z87.59 PERSONAL HISTORY OF OTHER COMPLICATIONS OF PREGNANCY, CHILDBIRTH AND THE PUERPERIUM: Chronic | ICD-10-CM

## 2019-07-16 PROCEDURE — 77065 DX MAMMO INCL CAD UNI: CPT | Mod: 26,LT

## 2019-07-16 PROCEDURE — G0279: CPT | Mod: 26

## 2019-07-16 PROCEDURE — 76642 ULTRASOUND BREAST LIMITED: CPT | Mod: 26,50

## 2019-07-16 PROCEDURE — 77065 DX MAMMO INCL CAD UNI: CPT

## 2019-07-16 PROCEDURE — 76642 ULTRASOUND BREAST LIMITED: CPT

## 2019-07-16 PROCEDURE — G0279: CPT

## 2019-07-22 ENCOUNTER — RESULT REVIEW (OUTPATIENT)
Age: 59
End: 2019-07-22

## 2019-08-13 ENCOUNTER — RX RENEWAL (OUTPATIENT)
Age: 59
End: 2019-08-13

## 2019-08-21 ENCOUNTER — TRANSCRIPTION ENCOUNTER (OUTPATIENT)
Age: 59
End: 2019-08-21

## 2019-12-05 ENCOUNTER — FORM ENCOUNTER (OUTPATIENT)
Age: 59
End: 2019-12-05

## 2019-12-06 ENCOUNTER — APPOINTMENT (OUTPATIENT)
Dept: MRI IMAGING | Facility: IMAGING CENTER | Age: 59
End: 2019-12-06
Payer: COMMERCIAL

## 2019-12-06 ENCOUNTER — OUTPATIENT (OUTPATIENT)
Dept: OUTPATIENT SERVICES | Facility: HOSPITAL | Age: 59
LOS: 1 days | End: 2019-12-06
Payer: COMMERCIAL

## 2019-12-06 DIAGNOSIS — Z98.890 OTHER SPECIFIED POSTPROCEDURAL STATES: Chronic | ICD-10-CM

## 2019-12-06 DIAGNOSIS — Z87.59 PERSONAL HISTORY OF OTHER COMPLICATIONS OF PREGNANCY, CHILDBIRTH AND THE PUERPERIUM: Chronic | ICD-10-CM

## 2019-12-06 DIAGNOSIS — Z98.891 HISTORY OF UTERINE SCAR FROM PREVIOUS SURGERY: Chronic | ICD-10-CM

## 2019-12-06 DIAGNOSIS — Z00.8 ENCOUNTER FOR OTHER GENERAL EXAMINATION: ICD-10-CM

## 2019-12-06 PROCEDURE — C8908: CPT

## 2019-12-06 PROCEDURE — A9585: CPT

## 2019-12-06 PROCEDURE — 77049 MRI BREAST C-+ W/CAD BI: CPT | Mod: 26

## 2019-12-06 PROCEDURE — C8937: CPT

## 2020-03-04 ENCOUNTER — APPOINTMENT (OUTPATIENT)
Dept: CARDIOLOGY | Facility: CLINIC | Age: 60
End: 2020-03-04
Payer: COMMERCIAL

## 2020-03-04 ENCOUNTER — NON-APPOINTMENT (OUTPATIENT)
Age: 60
End: 2020-03-04

## 2020-03-04 VITALS
OXYGEN SATURATION: 97 % | HEART RATE: 80 BPM | SYSTOLIC BLOOD PRESSURE: 134 MMHG | DIASTOLIC BLOOD PRESSURE: 86 MMHG | HEIGHT: 66 IN | BODY MASS INDEX: 26.36 KG/M2 | WEIGHT: 164 LBS

## 2020-03-04 DIAGNOSIS — M79.10 MYALGIA, UNSPECIFIED SITE: ICD-10-CM

## 2020-03-04 PROCEDURE — 99401 PREV MED CNSL INDIV APPRX 15: CPT

## 2020-03-04 PROCEDURE — 93000 ELECTROCARDIOGRAM COMPLETE: CPT

## 2020-03-04 PROCEDURE — 99214 OFFICE O/P EST MOD 30 MIN: CPT | Mod: 25

## 2020-03-05 LAB
ALBUMIN SERPL ELPH-MCNC: 4.7 G/DL
ALP BLD-CCNC: 77 U/L
ALT SERPL-CCNC: 17 U/L
ANION GAP SERPL CALC-SCNC: 14 MMOL/L
AST SERPL-CCNC: 18 U/L
BASOPHILS # BLD AUTO: 0.05 K/UL
BASOPHILS NFR BLD AUTO: 0.8 %
BILIRUB SERPL-MCNC: 0.4 MG/DL
BUN SERPL-MCNC: 13 MG/DL
CALCIUM SERPL-MCNC: 9.5 MG/DL
CHLORIDE SERPL-SCNC: 102 MMOL/L
CHOLEST SERPL-MCNC: 241 MG/DL
CHOLEST/HDLC SERPL: 2.3 RATIO
CO2 SERPL-SCNC: 25 MMOL/L
CREAT SERPL-MCNC: 0.58 MG/DL
EOSINOPHIL # BLD AUTO: 0.19 K/UL
EOSINOPHIL NFR BLD AUTO: 2.9 %
ESTIMATED AVERAGE GLUCOSE: 111 MG/DL
GLUCOSE SERPL-MCNC: 104 MG/DL
HBA1C MFR BLD HPLC: 5.5 %
HCT VFR BLD CALC: 44.3 %
HDLC SERPL-MCNC: 106 MG/DL
HGB BLD-MCNC: 14.1 G/DL
IMM GRANULOCYTES NFR BLD AUTO: 0.2 %
LDLC SERPL CALC-MCNC: 114 MG/DL
LYMPHOCYTES # BLD AUTO: 2.57 K/UL
LYMPHOCYTES NFR BLD AUTO: 39.5 %
MAN DIFF?: NORMAL
MCHC RBC-ENTMCNC: 28.1 PG
MCHC RBC-ENTMCNC: 31.8 GM/DL
MCV RBC AUTO: 88.4 FL
MONOCYTES # BLD AUTO: 0.41 K/UL
MONOCYTES NFR BLD AUTO: 6.3 %
NEUTROPHILS # BLD AUTO: 3.27 K/UL
NEUTROPHILS NFR BLD AUTO: 50.3 %
NT-PROBNP SERPL-MCNC: 130 PG/ML
PLATELET # BLD AUTO: 245 K/UL
POTASSIUM SERPL-SCNC: 4.8 MMOL/L
PROT SERPL-MCNC: 7 G/DL
RBC # BLD: 5.01 M/UL
RBC # FLD: 12.5 %
SODIUM SERPL-SCNC: 141 MMOL/L
TRIGL SERPL-MCNC: 107 MG/DL
TSH SERPL-ACNC: 0.95 UIU/ML
WBC # FLD AUTO: 6.5 K/UL

## 2020-03-23 ENCOUNTER — APPOINTMENT (OUTPATIENT)
Dept: SURGICAL ONCOLOGY | Facility: CLINIC | Age: 60
End: 2020-03-23

## 2020-04-01 ENCOUNTER — LABORATORY RESULT (OUTPATIENT)
Age: 60
End: 2020-04-01

## 2020-04-01 ENCOUNTER — APPOINTMENT (OUTPATIENT)
Dept: DISASTER EMERGENCY | Facility: CLINIC | Age: 60
End: 2020-04-01

## 2020-04-15 ENCOUNTER — RX RENEWAL (OUTPATIENT)
Age: 60
End: 2020-04-15

## 2020-04-25 ENCOUNTER — MESSAGE (OUTPATIENT)
Age: 60
End: 2020-04-25

## 2020-04-30 ENCOUNTER — OUTPATIENT (OUTPATIENT)
Dept: OUTPATIENT SERVICES | Facility: HOSPITAL | Age: 60
LOS: 1 days | Discharge: HOME | End: 2020-04-30

## 2020-04-30 DIAGNOSIS — Z98.890 OTHER SPECIFIED POSTPROCEDURAL STATES: Chronic | ICD-10-CM

## 2020-04-30 DIAGNOSIS — Z98.891 HISTORY OF UTERINE SCAR FROM PREVIOUS SURGERY: Chronic | ICD-10-CM

## 2020-04-30 DIAGNOSIS — N62 HYPERTROPHY OF BREAST: ICD-10-CM

## 2020-04-30 DIAGNOSIS — Z87.59 PERSONAL HISTORY OF OTHER COMPLICATIONS OF PREGNANCY, CHILDBIRTH AND THE PUERPERIUM: Chronic | ICD-10-CM

## 2020-05-01 ENCOUNTER — APPOINTMENT (OUTPATIENT)
Dept: HEMATOLOGY ONCOLOGY | Facility: CLINIC | Age: 60
End: 2020-05-01
Payer: COMMERCIAL

## 2020-05-01 ENCOUNTER — APPOINTMENT (OUTPATIENT)
Dept: DISASTER EMERGENCY | Facility: CLINIC | Age: 60
End: 2020-05-01

## 2020-05-01 PROCEDURE — 99214 OFFICE O/P EST MOD 30 MIN: CPT | Mod: 95

## 2020-05-02 LAB
SARS-COV-2 IGG SERPL IA-ACNC: 0.2 INDEX
SARS-COV-2 IGG SERPL QL IA: NEGATIVE

## 2020-05-22 NOTE — REVIEW OF SYSTEMS
[Negative] : Allergic/Immunologic [FreeTextEntry4] : See interval history. [FreeTextEntry3] : Last eye exam 04/2019, exam benign. [FreeTextEntry5] : See interval history. [FreeTextEntry2] : Energy is good today. S/P flu vaccination 9/2019.  [FreeTextEntry6] : Occasional annoying cough. [FreeTextEntry7] :  Most recent colonoscopy  12/2018, no polyps found. [FreeTextEntry8] : Last GYN  exam  in 8/2019 with benign findings. No vaginal bleeding. or spotting. [FreeTextEntry9] : Had BMD 5 years ago, thinks she is due to repeat it now.. [de-identified] : Has been off iron x 2 year without recurrence of anemia.

## 2020-05-22 NOTE — HISTORY OF PRESENT ILLNESS
[Treatment Protocol] : Treatment Protocol [Therapy: ___] : Therapy: [unfilled] [Other Location: e.g. School (Enter Location, City,State)___] : at [unfilled], at the time of the visit. [Self] : self [Medical Office: ([facility])___] : at the medical office located in  [de-identified] : The patient was seen in this office in 2007 and again in 2013 for breast cancer risk counseling.\par \par Her risk factors for breast cancer were a  family history of breast cancer on the maternal side including a mother with 2 breast cancers and a personal history of atypical lobular hyperplasia.\par \par She took tamoxifen from 8/2013 until 2/2015. In May 2015 she was documented to be post menopausal and tamoxifen was DC'd and raloxifene started. [FreeTextEntry3] : OCTAVIO HENDERSON [FreeTextEntry1] : tamoxifen 8/2013 - 2/2015\par Raloxifene start 5/2015 [de-identified] : The patent has been on SERMs for 5 years 5 months total,  and has been on Raloxifene for 4 years  11 months had 2 months hiatus from raloxifene when she initiated Diltiazem 01/2017.\par \par Had breast MRI 12/06/2019, stable BI RADS 2. \par \par Had bilateral mammogram and breast US 07/16/2019, heterogeneously dense breasts BI RADS 2\par \par Had initial consultation with breast  Surgeon Dr. Huseyin Catalan  02/01/2019 to transfer her care because of insurance coverage changes..\par \par Last saw cardiologist Dr Rangel Jeffries, 03/04/2020, exam stable.The patient had labs drawn during this visit. Result in EMR.\par \par Last saw endocrinologist Dr Smitha Barron 01/2019 for f/u and management of hypothyroidism, exam stable.\par \par The patient states she was exposed (had intermittent cough) to her boss who was COVID 19 positive.Patient states she  tested negative for COVID 19 PCR on 4/1/2020. 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 The patient was advised to have wide excision of the area concern.\par \par Overall the patient states she feels well since last seen.\par \par No other interval event since last visit.

## 2020-05-22 NOTE — REASON FOR VISIT
[Follow-Up Visit] : a follow-up [FreeTextEntry2] : Increased breast cancer risk related to ALH and family history

## 2020-06-01 ENCOUNTER — APPOINTMENT (OUTPATIENT)
Dept: CARDIOLOGY | Facility: CLINIC | Age: 60
End: 2020-06-01
Payer: SELF-PAY

## 2020-06-01 VITALS — HEIGHT: 66 IN | WEIGHT: 176 LBS | BODY MASS INDEX: 28.28 KG/M2

## 2020-06-01 DIAGNOSIS — E66.3 OVERWEIGHT: ICD-10-CM

## 2020-06-01 PROCEDURE — 97802 MEDICAL NUTRITION INDIV IN: CPT

## 2020-07-08 ENCOUNTER — RX RENEWAL (OUTPATIENT)
Age: 60
End: 2020-07-08

## 2020-07-22 ENCOUNTER — APPOINTMENT (OUTPATIENT)
Dept: ULTRASOUND IMAGING | Facility: IMAGING CENTER | Age: 60
End: 2020-07-22

## 2020-07-22 ENCOUNTER — RESULT REVIEW (OUTPATIENT)
Age: 60
End: 2020-07-22

## 2020-07-22 ENCOUNTER — APPOINTMENT (OUTPATIENT)
Dept: MAMMOGRAPHY | Facility: IMAGING CENTER | Age: 60
End: 2020-07-22

## 2020-07-22 ENCOUNTER — OUTPATIENT (OUTPATIENT)
Dept: OUTPATIENT SERVICES | Facility: HOSPITAL | Age: 60
LOS: 1 days | End: 2020-07-22
Payer: COMMERCIAL

## 2020-07-22 DIAGNOSIS — Z98.891 HISTORY OF UTERINE SCAR FROM PREVIOUS SURGERY: Chronic | ICD-10-CM

## 2020-07-22 DIAGNOSIS — Z00.8 ENCOUNTER FOR OTHER GENERAL EXAMINATION: ICD-10-CM

## 2020-07-22 DIAGNOSIS — Z98.890 OTHER SPECIFIED POSTPROCEDURAL STATES: Chronic | ICD-10-CM

## 2020-07-22 DIAGNOSIS — Z87.59 PERSONAL HISTORY OF OTHER COMPLICATIONS OF PREGNANCY, CHILDBIRTH AND THE PUERPERIUM: Chronic | ICD-10-CM

## 2020-07-22 PROCEDURE — 77067 SCR MAMMO BI INCL CAD: CPT | Mod: 26

## 2020-07-22 PROCEDURE — 76641 ULTRASOUND BREAST COMPLETE: CPT

## 2020-07-22 PROCEDURE — 77063 BREAST TOMOSYNTHESIS BI: CPT

## 2020-07-22 PROCEDURE — 77063 BREAST TOMOSYNTHESIS BI: CPT | Mod: 26

## 2020-07-22 PROCEDURE — 76641 ULTRASOUND BREAST COMPLETE: CPT | Mod: 26,50

## 2020-07-22 PROCEDURE — 77067 SCR MAMMO BI INCL CAD: CPT

## 2020-08-31 ENCOUNTER — RESULT REVIEW (OUTPATIENT)
Age: 60
End: 2020-08-31

## 2020-11-02 ENCOUNTER — APPOINTMENT (OUTPATIENT)
Dept: HEMATOLOGY ONCOLOGY | Facility: CLINIC | Age: 60
End: 2020-11-02

## 2020-12-01 ENCOUNTER — OUTPATIENT (OUTPATIENT)
Dept: OUTPATIENT SERVICES | Facility: HOSPITAL | Age: 60
LOS: 1 days | Discharge: HOME | End: 2020-12-01

## 2020-12-01 DIAGNOSIS — Z87.59 PERSONAL HISTORY OF OTHER COMPLICATIONS OF PREGNANCY, CHILDBIRTH AND THE PUERPERIUM: Chronic | ICD-10-CM

## 2020-12-01 DIAGNOSIS — Z98.891 HISTORY OF UTERINE SCAR FROM PREVIOUS SURGERY: Chronic | ICD-10-CM

## 2020-12-01 DIAGNOSIS — Z98.890 OTHER SPECIFIED POSTPROCEDURAL STATES: Chronic | ICD-10-CM

## 2020-12-01 DIAGNOSIS — N62 HYPERTROPHY OF BREAST: ICD-10-CM

## 2020-12-04 ENCOUNTER — RESULT REVIEW (OUTPATIENT)
Age: 60
End: 2020-12-04

## 2020-12-04 ENCOUNTER — APPOINTMENT (OUTPATIENT)
Dept: HEMATOLOGY ONCOLOGY | Facility: CLINIC | Age: 60
End: 2020-12-04
Payer: COMMERCIAL

## 2020-12-04 VITALS
TEMPERATURE: 98 F | OXYGEN SATURATION: 96 % | RESPIRATION RATE: 16 BRPM | DIASTOLIC BLOOD PRESSURE: 85 MMHG | HEART RATE: 90 BPM | SYSTOLIC BLOOD PRESSURE: 134 MMHG

## 2020-12-04 LAB
BASOPHILS # BLD AUTO: 0.07 K/UL — SIGNIFICANT CHANGE UP (ref 0–0.2)
BASOPHILS NFR BLD AUTO: 0.8 % — SIGNIFICANT CHANGE UP (ref 0–2)
EOSINOPHIL # BLD AUTO: 0.28 K/UL — SIGNIFICANT CHANGE UP (ref 0–0.5)
EOSINOPHIL NFR BLD AUTO: 3.4 % — SIGNIFICANT CHANGE UP (ref 0–6)
HCT VFR BLD CALC: 41.2 % — SIGNIFICANT CHANGE UP (ref 34.5–45)
HGB BLD-MCNC: 13.1 G/DL — SIGNIFICANT CHANGE UP (ref 11.5–15.5)
IMM GRANULOCYTES NFR BLD AUTO: 0.2 % — SIGNIFICANT CHANGE UP (ref 0–1.5)
LYMPHOCYTES # BLD AUTO: 2.78 K/UL — SIGNIFICANT CHANGE UP (ref 1–3.3)
LYMPHOCYTES # BLD AUTO: 33.4 % — SIGNIFICANT CHANGE UP (ref 13–44)
MCHC RBC-ENTMCNC: 28.1 PG — SIGNIFICANT CHANGE UP (ref 27–34)
MCHC RBC-ENTMCNC: 31.8 G/DL — LOW (ref 32–36)
MCV RBC AUTO: 88.2 FL — SIGNIFICANT CHANGE UP (ref 80–100)
MONOCYTES # BLD AUTO: 0.57 K/UL — SIGNIFICANT CHANGE UP (ref 0–0.9)
MONOCYTES NFR BLD AUTO: 6.9 % — SIGNIFICANT CHANGE UP (ref 2–14)
NEUTROPHILS # BLD AUTO: 4.6 K/UL — SIGNIFICANT CHANGE UP (ref 1.8–7.4)
NEUTROPHILS NFR BLD AUTO: 55.3 % — SIGNIFICANT CHANGE UP (ref 43–77)
NRBC # BLD: 0 /100 WBCS — SIGNIFICANT CHANGE UP (ref 0–0)
PLATELET # BLD AUTO: 246 K/UL — SIGNIFICANT CHANGE UP (ref 150–400)
RBC # BLD: 4.67 M/UL — SIGNIFICANT CHANGE UP (ref 3.8–5.2)
RBC # FLD: 12.5 % — SIGNIFICANT CHANGE UP (ref 10.3–14.5)
WBC # BLD: 8.32 K/UL — SIGNIFICANT CHANGE UP (ref 3.8–10.5)
WBC # FLD AUTO: 8.32 K/UL — SIGNIFICANT CHANGE UP (ref 3.8–10.5)

## 2020-12-04 PROCEDURE — 99072 ADDL SUPL MATRL&STAF TM PHE: CPT

## 2020-12-04 PROCEDURE — 99215 OFFICE O/P EST HI 40 MIN: CPT

## 2020-12-15 ENCOUNTER — NON-APPOINTMENT (OUTPATIENT)
Age: 60
End: 2020-12-15

## 2020-12-15 LAB
25(OH)D3 SERPL-MCNC: 24.8 NG/ML
ALBUMIN SERPL ELPH-MCNC: 4.3 G/DL
ALP BLD-CCNC: 96 U/L
ALT SERPL-CCNC: 21 U/L
ANION GAP SERPL CALC-SCNC: 10 MMOL/L
AST SERPL-CCNC: 22 U/L
BILIRUB SERPL-MCNC: 0.2 MG/DL
BUN SERPL-MCNC: 15 MG/DL
CALCIUM SERPL-MCNC: 9.5 MG/DL
CHLORIDE SERPL-SCNC: 101 MMOL/L
CO2 SERPL-SCNC: 26 MMOL/L
CREAT SERPL-MCNC: 0.59 MG/DL
GLUCOSE SERPL-MCNC: 117 MG/DL
POTASSIUM SERPL-SCNC: 4.2 MMOL/L
PROT SERPL-MCNC: 6.9 G/DL
SODIUM SERPL-SCNC: 138 MMOL/L
TSH SERPL-ACNC: 2.72 UIU/ML

## 2020-12-16 LAB — SARS-COV-2 N GENE NPH QL NAA+PROBE: NOT DETECTED

## 2020-12-18 ENCOUNTER — APPOINTMENT (OUTPATIENT)
Dept: MRI IMAGING | Facility: IMAGING CENTER | Age: 60
End: 2020-12-18

## 2021-01-09 NOTE — HISTORY OF PRESENT ILLNESS
[Treatment Protocol] : Treatment Protocol [Therapy: ___] : Therapy: [unfilled] [Other Location: e.g. School (Enter Location, City,State)___] : at [unfilled], at the time of the visit. [Medical Office: ([facility])___] : at the medical office located in  [Self] : self [FreeTextEntry3] : OCTAVIO HENDERSON [de-identified] : The patient was seen in this office in 2007 and again in 2013 for breast cancer risk counseling.\par \par Her risk factors for breast cancer were a  family history of breast cancer on the maternal side including a mother with 2 breast cancers and a personal history of atypical lobular hyperplasia.\par \par She took tamoxifen from 8/2013 until 2/2015. \par In May 2015 she was documented to be post menopausal and tamoxifen was DC'd and raloxifene started. [FreeTextEntry1] : tamoxifen 8/2013 - 2/2015\par Raloxifene start 5/2015 [de-identified] : \par Ms. OCTAVIO HENDERSON  is here for a follow up appt for [facility]. SHE WAS PRESCRIBED TAMOXIFEN 2/2013-2/2015 but she reports she didn’t take it much. She was poorly compliant due to s/e. Started raloxifene 5/2015. She doesn’t have s/e. She has been f/b Dr Pan. First visit with me, as Dr ROSS has retired. She is a [employer] RN\par We discussed she has completed 5 yrs of endocrine therapy for breast cancer prevention. There is no data to use raloxifene for more than 5 yrs. She was in agreement to stop raloxifene. She will continue breast imaging with Dr Catalan. Also reviewed role of limited etoh consumption and increase exercise for breast cancer prevention. \par breast imaging mammo/sono 7/2020: birads 2\par breast MRI 12/2019

## 2021-01-09 NOTE — REASON FOR VISIT
[Follow-Up Visit] : a follow-up [Other: _____] : [unfilled] [FreeTextEntry2] : Increased breast cancer risk related to ALH and family history

## 2021-01-09 NOTE — ASSESSMENT
[FreeTextEntry1] : Ms. OCTAVIO HENDERSON  is here for a follow up appt for AL. SHE WAS PRESCRIBED TAMOXIFEN 2/2013-2/2015 but she reports she didn’t take it much. She was poorly compliant due to s/e. Started raloxifene 5/2015. She doesn’t have s/e. She has been f/b Dr Pan. First visit with me, as Dr ROSS has retired. She is a [employer] RN\par We discussed she has completed 5 yrs of endocrine therapy for breast cancer prevention. There is no data to use raloxifene for more than 5 yrs. She was in agreement to stop raloxifene. She will continue breast imaging with Dr Catalan. Also reviewed role of limited etoh consumption and increase exercise for breast cancer prevention. \par breast imaging mammo/sono 7/2020: birads 2\par breast MRI 12/2019 \par low vit D- discussed to increase vit D intake\par rto 1 y

## 2021-01-22 ENCOUNTER — APPOINTMENT (OUTPATIENT)
Dept: MRI IMAGING | Facility: IMAGING CENTER | Age: 61
End: 2021-01-22
Payer: COMMERCIAL

## 2021-01-22 ENCOUNTER — RESULT REVIEW (OUTPATIENT)
Age: 61
End: 2021-01-22

## 2021-01-22 ENCOUNTER — OUTPATIENT (OUTPATIENT)
Dept: OUTPATIENT SERVICES | Facility: HOSPITAL | Age: 61
LOS: 1 days | End: 2021-01-22
Payer: COMMERCIAL

## 2021-01-22 DIAGNOSIS — N60.99 UNSPECIFIED BENIGN MAMMARY DYSPLASIA OF UNSPECIFIED BREAST: ICD-10-CM

## 2021-01-22 DIAGNOSIS — Z98.890 OTHER SPECIFIED POSTPROCEDURAL STATES: Chronic | ICD-10-CM

## 2021-01-22 DIAGNOSIS — Z00.8 ENCOUNTER FOR OTHER GENERAL EXAMINATION: ICD-10-CM

## 2021-01-22 DIAGNOSIS — Z98.891 HISTORY OF UTERINE SCAR FROM PREVIOUS SURGERY: Chronic | ICD-10-CM

## 2021-01-22 DIAGNOSIS — Z87.59 PERSONAL HISTORY OF OTHER COMPLICATIONS OF PREGNANCY, CHILDBIRTH AND THE PUERPERIUM: Chronic | ICD-10-CM

## 2021-01-22 PROCEDURE — C8908: CPT

## 2021-01-22 PROCEDURE — 77049 MRI BREAST C-+ W/CAD BI: CPT | Mod: 26

## 2021-01-22 PROCEDURE — A9585: CPT

## 2021-01-22 PROCEDURE — C8937: CPT

## 2021-02-01 ENCOUNTER — RX RENEWAL (OUTPATIENT)
Age: 61
End: 2021-02-01

## 2021-02-05 LAB — SARS-COV-2 N GENE NPH QL NAA+PROBE: NOT DETECTED

## 2021-02-23 DIAGNOSIS — R05 COUGH: ICD-10-CM

## 2021-02-26 ENCOUNTER — APPOINTMENT (OUTPATIENT)
Dept: RADIOLOGY | Facility: IMAGING CENTER | Age: 61
End: 2021-02-26
Payer: COMMERCIAL

## 2021-02-26 ENCOUNTER — RESULT REVIEW (OUTPATIENT)
Age: 61
End: 2021-02-26

## 2021-02-26 ENCOUNTER — OUTPATIENT (OUTPATIENT)
Dept: OUTPATIENT SERVICES | Facility: HOSPITAL | Age: 61
LOS: 1 days | End: 2021-02-26
Payer: COMMERCIAL

## 2021-02-26 DIAGNOSIS — Z00.8 ENCOUNTER FOR OTHER GENERAL EXAMINATION: ICD-10-CM

## 2021-02-26 DIAGNOSIS — Z98.891 HISTORY OF UTERINE SCAR FROM PREVIOUS SURGERY: Chronic | ICD-10-CM

## 2021-02-26 DIAGNOSIS — R05 COUGH: ICD-10-CM

## 2021-02-26 DIAGNOSIS — Z98.890 OTHER SPECIFIED POSTPROCEDURAL STATES: Chronic | ICD-10-CM

## 2021-02-26 DIAGNOSIS — Z87.59 PERSONAL HISTORY OF OTHER COMPLICATIONS OF PREGNANCY, CHILDBIRTH AND THE PUERPERIUM: Chronic | ICD-10-CM

## 2021-02-26 PROCEDURE — 71046 X-RAY EXAM CHEST 2 VIEWS: CPT

## 2021-02-26 PROCEDURE — 71046 X-RAY EXAM CHEST 2 VIEWS: CPT | Mod: 26

## 2021-03-03 ENCOUNTER — NON-APPOINTMENT (OUTPATIENT)
Age: 61
End: 2021-03-03

## 2021-03-11 DIAGNOSIS — N60.99 UNSPECIFIED BENIGN MAMMARY DYSPLASIA OF UNSPECIFIED BREAST: ICD-10-CM

## 2021-03-20 ENCOUNTER — TRANSCRIPTION ENCOUNTER (OUTPATIENT)
Age: 61
End: 2021-03-20

## 2021-04-12 ENCOUNTER — NON-APPOINTMENT (OUTPATIENT)
Age: 61
End: 2021-04-12

## 2021-06-09 ENCOUNTER — NON-APPOINTMENT (OUTPATIENT)
Age: 61
End: 2021-06-09

## 2021-06-09 ENCOUNTER — APPOINTMENT (OUTPATIENT)
Dept: CARDIOLOGY | Facility: CLINIC | Age: 61
End: 2021-06-09
Payer: COMMERCIAL

## 2021-06-09 ENCOUNTER — LABORATORY RESULT (OUTPATIENT)
Age: 61
End: 2021-06-09

## 2021-06-09 VITALS
DIASTOLIC BLOOD PRESSURE: 84 MMHG | HEIGHT: 66 IN | BODY MASS INDEX: 28.28 KG/M2 | OXYGEN SATURATION: 98 % | SYSTOLIC BLOOD PRESSURE: 138 MMHG | RESPIRATION RATE: 16 BRPM | WEIGHT: 176 LBS | HEART RATE: 87 BPM

## 2021-06-09 PROCEDURE — 99215 OFFICE O/P EST HI 40 MIN: CPT | Mod: 25

## 2021-06-09 PROCEDURE — 93000 ELECTROCARDIOGRAM COMPLETE: CPT

## 2021-06-09 PROCEDURE — 99072 ADDL SUPL MATRL&STAF TM PHE: CPT

## 2021-06-09 PROCEDURE — 99402 PREV MED CNSL INDIV APPRX 30: CPT

## 2021-06-10 LAB
ALBUMIN SERPL ELPH-MCNC: 4.5 G/DL
ALP BLD-CCNC: 102 U/L
ALT SERPL-CCNC: 19 U/L
ANION GAP SERPL CALC-SCNC: 13 MMOL/L
AST SERPL-CCNC: 19 U/L
BASOPHILS # BLD AUTO: 0.07 K/UL
BASOPHILS NFR BLD AUTO: 0.7 %
BILIRUB SERPL-MCNC: 0.2 MG/DL
BUN SERPL-MCNC: 15 MG/DL
CALCIUM SERPL-MCNC: 9.6 MG/DL
CHLORIDE SERPL-SCNC: 102 MMOL/L
CHOLEST SERPL-MCNC: 207 MG/DL
CO2 SERPL-SCNC: 26 MMOL/L
CREAT SERPL-MCNC: 0.72 MG/DL
EOSINOPHIL # BLD AUTO: 0.49 K/UL
EOSINOPHIL NFR BLD AUTO: 4.9 %
ESTIMATED AVERAGE GLUCOSE: 114 MG/DL
GLUCOSE SERPL-MCNC: 101 MG/DL
HBA1C MFR BLD HPLC: 5.6 %
HCT VFR BLD CALC: 43.7 %
HDLC SERPL-MCNC: 72 MG/DL
HGB BLD-MCNC: 13.4 G/DL
IMM GRANULOCYTES NFR BLD AUTO: 0.2 %
LDLC SERPL CALC-MCNC: 100 MG/DL
LYMPHOCYTES # BLD AUTO: 2.91 K/UL
LYMPHOCYTES NFR BLD AUTO: 28.8 %
MAN DIFF?: NORMAL
MCHC RBC-ENTMCNC: 26.3 PG
MCHC RBC-ENTMCNC: 30.7 GM/DL
MCV RBC AUTO: 85.9 FL
MONOCYTES # BLD AUTO: 0.63 K/UL
MONOCYTES NFR BLD AUTO: 6.2 %
NEUTROPHILS # BLD AUTO: 5.98 K/UL
NEUTROPHILS NFR BLD AUTO: 59.2 %
NONHDLC SERPL-MCNC: 135 MG/DL
PLATELET # BLD AUTO: NORMAL K/UL
POTASSIUM SERPL-SCNC: 4.3 MMOL/L
PROT SERPL-MCNC: 6.8 G/DL
RBC # BLD: 5.09 M/UL
RBC # FLD: 13.2 %
SODIUM SERPL-SCNC: 141 MMOL/L
TRIGL SERPL-MCNC: 176 MG/DL
TSH SERPL-ACNC: 0.18 UIU/ML
WBC # FLD AUTO: 10.1 K/UL

## 2021-07-23 ENCOUNTER — OUTPATIENT (OUTPATIENT)
Dept: OUTPATIENT SERVICES | Facility: HOSPITAL | Age: 61
LOS: 1 days | End: 2021-07-23
Payer: COMMERCIAL

## 2021-07-23 ENCOUNTER — APPOINTMENT (OUTPATIENT)
Dept: MAMMOGRAPHY | Facility: IMAGING CENTER | Age: 61
End: 2021-07-23
Payer: COMMERCIAL

## 2021-07-23 ENCOUNTER — RESULT REVIEW (OUTPATIENT)
Age: 61
End: 2021-07-23

## 2021-07-23 ENCOUNTER — APPOINTMENT (OUTPATIENT)
Dept: ULTRASOUND IMAGING | Facility: IMAGING CENTER | Age: 61
End: 2021-07-23
Payer: COMMERCIAL

## 2021-07-23 DIAGNOSIS — Z00.8 ENCOUNTER FOR OTHER GENERAL EXAMINATION: ICD-10-CM

## 2021-07-23 DIAGNOSIS — Z98.890 OTHER SPECIFIED POSTPROCEDURAL STATES: Chronic | ICD-10-CM

## 2021-07-23 DIAGNOSIS — Z87.59 PERSONAL HISTORY OF OTHER COMPLICATIONS OF PREGNANCY, CHILDBIRTH AND THE PUERPERIUM: Chronic | ICD-10-CM

## 2021-07-23 DIAGNOSIS — Z98.891 HISTORY OF UTERINE SCAR FROM PREVIOUS SURGERY: Chronic | ICD-10-CM

## 2021-07-23 PROCEDURE — 76641 ULTRASOUND BREAST COMPLETE: CPT | Mod: 26,50

## 2021-07-23 PROCEDURE — 77063 BREAST TOMOSYNTHESIS BI: CPT | Mod: 26

## 2021-07-23 PROCEDURE — 77063 BREAST TOMOSYNTHESIS BI: CPT

## 2021-07-23 PROCEDURE — 77067 SCR MAMMO BI INCL CAD: CPT | Mod: 26

## 2021-07-23 PROCEDURE — 76641 ULTRASOUND BREAST COMPLETE: CPT

## 2021-07-23 PROCEDURE — 77067 SCR MAMMO BI INCL CAD: CPT

## 2021-07-27 ENCOUNTER — APPOINTMENT (OUTPATIENT)
Dept: MAMMOGRAPHY | Facility: IMAGING CENTER | Age: 61
End: 2021-07-27
Payer: COMMERCIAL

## 2021-07-27 ENCOUNTER — OUTPATIENT (OUTPATIENT)
Dept: OUTPATIENT SERVICES | Facility: HOSPITAL | Age: 61
LOS: 1 days | End: 2021-07-27
Payer: COMMERCIAL

## 2021-07-27 ENCOUNTER — RESULT REVIEW (OUTPATIENT)
Age: 61
End: 2021-07-27

## 2021-07-27 ENCOUNTER — NON-APPOINTMENT (OUTPATIENT)
Age: 61
End: 2021-07-27

## 2021-07-27 DIAGNOSIS — Z87.59 PERSONAL HISTORY OF OTHER COMPLICATIONS OF PREGNANCY, CHILDBIRTH AND THE PUERPERIUM: Chronic | ICD-10-CM

## 2021-07-27 DIAGNOSIS — Z98.890 OTHER SPECIFIED POSTPROCEDURAL STATES: Chronic | ICD-10-CM

## 2021-07-27 DIAGNOSIS — Z98.891 HISTORY OF UTERINE SCAR FROM PREVIOUS SURGERY: Chronic | ICD-10-CM

## 2021-07-27 DIAGNOSIS — Z00.8 ENCOUNTER FOR OTHER GENERAL EXAMINATION: ICD-10-CM

## 2021-07-27 PROCEDURE — 77065 DX MAMMO INCL CAD UNI: CPT

## 2021-07-27 PROCEDURE — 77065 DX MAMMO INCL CAD UNI: CPT | Mod: 26,RT

## 2021-07-27 NOTE — ASU PREOP CHECKLIST - VIA
Patient reports that he feels that he has a bit less pain in his legs. He still reports pain in his buttocks and low back.   Went over follow up appointment.   Patient reports that he is up to 30 out of 40 on his programing that he was advised that he could reach out to the reps for continued programming.   Reminded patient to use ice on the area. Understanding verbalized.    
ambulate

## 2021-07-28 ENCOUNTER — NON-APPOINTMENT (OUTPATIENT)
Age: 61
End: 2021-07-28

## 2021-07-28 ENCOUNTER — APPOINTMENT (OUTPATIENT)
Dept: MAMMOGRAPHY | Facility: IMAGING CENTER | Age: 61
End: 2021-07-28

## 2021-08-24 ENCOUNTER — APPOINTMENT (OUTPATIENT)
Dept: RADIOLOGY | Facility: CLINIC | Age: 61
End: 2021-08-24
Payer: COMMERCIAL

## 2021-08-24 ENCOUNTER — OUTPATIENT (OUTPATIENT)
Dept: OUTPATIENT SERVICES | Facility: HOSPITAL | Age: 61
LOS: 1 days | End: 2021-08-24
Payer: COMMERCIAL

## 2021-08-24 ENCOUNTER — TRANSCRIPTION ENCOUNTER (OUTPATIENT)
Age: 61
End: 2021-08-24

## 2021-08-24 DIAGNOSIS — Z98.891 HISTORY OF UTERINE SCAR FROM PREVIOUS SURGERY: Chronic | ICD-10-CM

## 2021-08-24 DIAGNOSIS — M25.569 PAIN IN UNSPECIFIED KNEE: ICD-10-CM

## 2021-08-24 DIAGNOSIS — Z98.890 OTHER SPECIFIED POSTPROCEDURAL STATES: Chronic | ICD-10-CM

## 2021-08-24 DIAGNOSIS — Z87.59 PERSONAL HISTORY OF OTHER COMPLICATIONS OF PREGNANCY, CHILDBIRTH AND THE PUERPERIUM: Chronic | ICD-10-CM

## 2021-08-24 PROCEDURE — 73564 X-RAY EXAM KNEE 4 OR MORE: CPT | Mod: 26,LT

## 2021-08-24 PROCEDURE — 73564 X-RAY EXAM KNEE 4 OR MORE: CPT

## 2021-08-24 RX ORDER — DILTIAZEM HYDROCHLORIDE 120 MG/1
120 CAPSULE, EXTENDED RELEASE ORAL
Qty: 90 | Refills: 0 | Status: DISCONTINUED | COMMUNITY
Start: 2018-10-31 | End: 2021-08-24

## 2021-08-24 RX ORDER — DILTIAZEM HYDROCHLORIDE 120 MG/1
120 CAPSULE, EXTENDED RELEASE ORAL
Qty: 90 | Refills: 1 | Status: DISCONTINUED | COMMUNITY
Start: 2019-02-05 | End: 2021-08-24

## 2021-09-13 ENCOUNTER — APPOINTMENT (OUTPATIENT)
Dept: OBGYN | Facility: CLINIC | Age: 61
End: 2021-09-13
Payer: COMMERCIAL

## 2021-09-13 ENCOUNTER — ASOB RESULT (OUTPATIENT)
Age: 61
End: 2021-09-13

## 2021-09-13 VITALS
BODY MASS INDEX: 27.32 KG/M2 | SYSTOLIC BLOOD PRESSURE: 126 MMHG | WEIGHT: 170 LBS | DIASTOLIC BLOOD PRESSURE: 78 MMHG | HEIGHT: 66 IN

## 2021-09-13 DIAGNOSIS — Z01.419 ENCOUNTER FOR GYNECOLOGICAL EXAMINATION (GENERAL) (ROUTINE) W/OUT ABNORMAL FINDINGS: ICD-10-CM

## 2021-09-13 DIAGNOSIS — Z00.00 ENCOUNTER FOR GENERAL ADULT MEDICAL EXAMINATION W/OUT ABNORMAL FINDINGS: ICD-10-CM

## 2021-09-13 PROCEDURE — 82270 OCCULT BLOOD FECES: CPT

## 2021-09-13 PROCEDURE — 99396 PREV VISIT EST AGE 40-64: CPT

## 2021-09-13 PROCEDURE — 76830 TRANSVAGINAL US NON-OB: CPT

## 2021-09-15 LAB — HPV HIGH+LOW RISK DNA PNL CVX: NOT DETECTED

## 2021-09-16 ENCOUNTER — APPOINTMENT (OUTPATIENT)
Dept: OBGYN | Facility: CLINIC | Age: 61
End: 2021-09-16
Payer: COMMERCIAL

## 2021-09-16 LAB — CYTOLOGY CVX/VAG DOC THIN PREP: NORMAL

## 2021-09-16 PROCEDURE — 77080 DXA BONE DENSITY AXIAL: CPT

## 2021-10-01 ENCOUNTER — LABORATORY RESULT (OUTPATIENT)
Age: 61
End: 2021-10-01

## 2021-10-22 LAB
COVID-19 SPIKE DOMAIN ANTIBODY INTERPRETATION: POSITIVE
SARS-COV-2 AB SERPL IA-ACNC: >250 U/ML

## 2021-11-17 DIAGNOSIS — Z20.822 CONTACT WITH AND (SUSPECTED) EXPOSURE TO COVID-19: ICD-10-CM

## 2021-11-18 LAB — SARS-COV-2 N GENE NPH QL NAA+PROBE: NOT DETECTED

## 2021-12-03 ENCOUNTER — APPOINTMENT (OUTPATIENT)
Dept: HEMATOLOGY ONCOLOGY | Facility: CLINIC | Age: 61
End: 2021-12-03

## 2021-12-21 LAB — SARS-COV-2 N GENE NPH QL NAA+PROBE: NOT DETECTED

## 2021-12-30 LAB — SARS-COV-2 N GENE NPH QL NAA+PROBE: NOT DETECTED

## 2022-01-06 LAB — SARS-COV-2 N GENE NPH QL NAA+PROBE: NOT DETECTED

## 2022-01-07 ENCOUNTER — OUTPATIENT (OUTPATIENT)
Dept: OUTPATIENT SERVICES | Facility: HOSPITAL | Age: 62
LOS: 1 days | Discharge: HOME | End: 2022-01-07

## 2022-01-07 ENCOUNTER — RESULT REVIEW (OUTPATIENT)
Age: 62
End: 2022-01-07

## 2022-01-07 ENCOUNTER — APPOINTMENT (OUTPATIENT)
Dept: HEMATOLOGY ONCOLOGY | Facility: CLINIC | Age: 62
End: 2022-01-07
Payer: COMMERCIAL

## 2022-01-07 VITALS
HEART RATE: 85 BPM | OXYGEN SATURATION: 98 % | RESPIRATION RATE: 16 BRPM | SYSTOLIC BLOOD PRESSURE: 164 MMHG | DIASTOLIC BLOOD PRESSURE: 90 MMHG

## 2022-01-07 DIAGNOSIS — Z87.59 PERSONAL HISTORY OF OTHER COMPLICATIONS OF PREGNANCY, CHILDBIRTH AND THE PUERPERIUM: Chronic | ICD-10-CM

## 2022-01-07 DIAGNOSIS — Z98.891 HISTORY OF UTERINE SCAR FROM PREVIOUS SURGERY: Chronic | ICD-10-CM

## 2022-01-07 DIAGNOSIS — N62 HYPERTROPHY OF BREAST: ICD-10-CM

## 2022-01-07 DIAGNOSIS — Z98.890 OTHER SPECIFIED POSTPROCEDURAL STATES: Chronic | ICD-10-CM

## 2022-01-07 PROCEDURE — 99213 OFFICE O/P EST LOW 20 MIN: CPT

## 2022-01-17 NOTE — HISTORY OF PRESENT ILLNESS
[Treatment Protocol] : Treatment Protocol [Therapy: ___] : Therapy: [unfilled] [de-identified] : The patient was seen in this office in 2007 and again in 2013 for breast cancer risk counseling.\par \par Her risk factors for breast cancer were a  family history of breast cancer on the maternal side including a mother with 2 breast cancers and a personal history of atypical lobular hyperplasia.\par \par She took tamoxifen from 8/2013 until 2/2015. \par In May 2015 she was documented to be post menopausal and tamoxifen was DC'd and raloxifene started. [FreeTextEntry1] : tamoxifen 8/2013 - 2/2015\par Raloxifene start 5/2015 [de-identified] : Ms. OCTAVIO HENDERSON  is here for a follow up appt for AL. SHE WAS PRESCRIBED TAMOXIFEN 2/2013-2/2015 but she reports she didn’t take it much. She was poorly compliant due to s/e. Started raloxifene 5/2015. She doesn’t have s/e. She has been f/b Dr Pan. First visit with me 12/2020, as Dr ROSS has retired. She is a [employer] RN\par We discussed she has completed 5 yrs of endocrine therapy for breast cancer prevention. There is no data to use raloxifene for more than 5 yrs. She was in agreement to stop raloxifene. Stopped 12/2020\par She will continue breast imaging with Dr Catalan. Dr Catalan in May 2022 and will discuss MRI \par breast imaging mammo/sono 7/2020, 7/2021: birads 2\par breast MRI 12/2019 , 1/2021 birads 2\par Discussed MRI and reviewed previous imaging.

## 2022-01-17 NOTE — ASSESSMENT
[FreeTextEntry1] : Ms. OCTAVIO HENDERSON  is here for a follow up appt for AL. SHE WAS PRESCRIBED TAMOXIFEN 2/2013-2/2015 but she reports she didn’t take it much. She was poorly compliant due to s/e. Started raloxifene 5/2015. She doesn’t have s/e. She has been f/b Dr Pan. First visit with me 12/2020, as Dr ROSS has retired. She is a [employer] RN\par We discussed she has completed 5 yrs of endocrine therapy for breast cancer prevention. There is no data to use raloxifene for more than 5 yrs. She was in agreement to stop raloxifene. Stopped 12/2020\par She will continue breast imaging with Dr Catalan. Dr Catalan in May 2022 and will discuss MRI \par breast imaging mammo/sono 7/2020, 7/2021: birads 2\par breast MRI 12/2019 , 1/2021 birads 2\par Discussed MRI and reviewed previous imaging. She will  discuss MRI with Dr Catalan\par continue anuual mammo/sono\par rto 1 y

## 2022-01-17 NOTE — PHYSICAL EXAM
[Fully active, able to carry on all pre-disease performance without restriction] : Status 0 - Fully active, able to carry on all pre-disease performance without restriction [Normal] : affect appropriate [de-identified] : left breast lower medial scar, left lumpectomy scar

## 2022-01-26 ENCOUNTER — APPOINTMENT (OUTPATIENT)
Dept: HEMATOLOGY ONCOLOGY | Facility: CLINIC | Age: 62
End: 2022-01-26

## 2022-03-31 ENCOUNTER — NON-APPOINTMENT (OUTPATIENT)
Age: 62
End: 2022-03-31

## 2022-04-11 ENCOUNTER — APPOINTMENT (OUTPATIENT)
Dept: SURGICAL ONCOLOGY | Facility: CLINIC | Age: 62
End: 2022-04-11

## 2022-04-20 DIAGNOSIS — Z20.822 CONTACT WITH AND (SUSPECTED) EXPOSURE TO COVID-19: ICD-10-CM

## 2022-04-21 LAB — SARS-COV-2 N GENE NPH QL NAA+PROBE: NOT DETECTED

## 2022-04-27 ENCOUNTER — APPOINTMENT (OUTPATIENT)
Dept: SURGICAL ONCOLOGY | Facility: CLINIC | Age: 62
End: 2022-04-27
Payer: COMMERCIAL

## 2022-04-27 VITALS
TEMPERATURE: 97.8 F | DIASTOLIC BLOOD PRESSURE: 89 MMHG | OXYGEN SATURATION: 97 % | RESPIRATION RATE: 17 BRPM | HEART RATE: 93 BPM | SYSTOLIC BLOOD PRESSURE: 161 MMHG | HEIGHT: 66 IN

## 2022-04-27 PROCEDURE — 99203 OFFICE O/P NEW LOW 30 MIN: CPT

## 2022-04-27 NOTE — ASSESSMENT
[FreeTextEntry1] : The patient is a 62 year old female with history of R breast excisional biopsy showing ALH, here to establish care.\par \par We reviewed the significance of ALH in the office today. We discussed that AH confers a substantial increase in the risk of subsequent, bilateral breast cancer. Therefore, patients should be under active surveillance and offered chemoprevention. She already sees Dr. Soler and had been on Tamoxifen from 4983-6305, but was poorly compliant due to side effects. She was then started on raloxifene in 2015 and she took this until 2020 when she stopped. \par \par We will obtain imaging every 6 months, alternating mammograms and MRI, and we will see the patient in the office for a clinical breast exam after the imaging is performed. The patient is concerned about cumulative effects of MRI contrast and dementia risk--we discussed that we can scale back on MRIs to every other year if the patient is concerned. \par \par  Plan:\par  - Next mmg 7/2022 B/L SM and US\par  - Next MRI 1/2023\par  - RTO 1 year

## 2022-04-27 NOTE — PAST MEDICAL HISTORY
[Postmenopausal] : The patient is postmenopausal [Menarche Age ____] : age at menarche was [unfilled] [Menopause Age____] : age at menopause was [unfilled] [History of Hormone Replacement Treatment] : has a history of hormone replacement treatment [Total Preg ___] : G[unfilled] [Live Births ___] : P[unfilled]  [Age At Live Birth ___] : Age at live birth: [unfilled] [de-identified] : Uses Vagifem twice weekly x 10 years [FreeTextEntry6] : none [FreeTextEntry7] : 5 years [FreeTextEntry8] : none

## 2022-04-27 NOTE — PHYSICAL EXAM
[Normocephalic] : normocephalic [Atraumatic] : atraumatic [EOMI] : extra ocular movement intact [PERRL] : pupils equal, round and reactive to light [Sclera nonicteric] : sclera nonicteric [Supple] : supple [No Supraclavicular Adenopathy] : no supraclavicular adenopathy [No Cervical Adenopathy] : no cervical adenopathy [No Thyromegaly] : no thyromegaly [Examined in the supine and seated position] : examined in the supine and seated position [Bra Size: ___] : Bra Size: [unfilled] [Grade 1] : Ptosis Grade 1 [No dominant masses] : no dominant masses in right breast  [No dominant masses] : no dominant masses left breast [No Nipple Retraction] : no left nipple retraction [No Nipple Discharge] : no left nipple discharge [Breast Mass Right Breast ___cm] : no masses [Breast Mass Left Breast ___cm] : no masses [Breast Nipple Inversion] : nipples not inverted [Breast Nipple Retraction] : nipples not retracted [Breast Nipple Flattening] : nipples not flattened [Breast Nipple Fissures] : nipples not fissured [Breast Abnormal Lactation (Galactorrhea)] : no galactorrhea [Breast Abnormal Secretion Bloody Fluid] : no bloody discharge [Breast Abnormal Secretion Serous Fluid] : no serous discharge [Breast Abnormal Secretion Opalescent Fluid] : no milky discharge [No Axillary Lymphadenopathy] : no left axillary lymphadenopathy [No Edema] : no edema [No Rashes] : no rashes [No Ulceration] : no ulceration [de-identified] : non-labored respirations  [de-identified] : well-healed superior circumareolar incision [de-identified] : well-healed inferior circumareolar incision

## 2022-04-27 NOTE — CONSULT LETTER
[Dear  ___] : Dear  [unfilled], [Consult Letter:] : I had the pleasure of evaluating your patient, [unfilled]. [Please see my note below.] : Please see my note below. [Consult Closing:] : Thank you very much for allowing me to participate in the care of this patient.  If you have any questions, please do not hesitate to contact me. [Sincerely,] : Sincerely, [DrAshanti  ___] : Dr. JOHN [FreeTextEntry3] : Mikaela Potter MD\par Breast Surgeon\par Division of Surgical Oncology\par Department of Surgery\par [address] [place] [address]\[place] [address] \par Tel: [phone]\par Fax: [phone]\par Email: [email or web address]

## 2022-04-27 NOTE — HISTORY OF PRESENT ILLNESS
[FreeTextEntry1] : The patient is a 62 year old female referred for consultation by Dr. Sury Fuentes for high risk evaluation.\par \par The patient reports having history of a R breast excisional biopsy done in  showing ALH. She has also had a Left breast ecisional biopsy in 2016 that was benign. For the atypia, the patient was put on tamoxifen, which she had significant side effects with (hot flashes, mood swings), and she was not very compliant. This was switched to raloxifene after menopause in . She completed 5 years of treatment, and stopped in .\par \par Her most recent imaging:\par \par 2021 MRI\par  - Neg, BR2\par \par 2021 B/L SM (NW), TC 24.6%, heterogeneously dense breasts\par – B/L postoperative changes\par – R anterior central outer questionable calcifications lateral to clip–>DM\par – L negative\par \par 2021 B/L US\par – R scattered cysts up to 3 mm\par – L 3:00 N12 nonspecific lymph node up to 6 mm\par – BR0\par \par 2021 R DM\par – R breast postsurgical changes\par –Could not replicate calcifications, consistent with artifact\par – BR2\par \par Today, the patient reports no breast complaints. Denies pain, masses, skin changes, or nipple discharge. \par \par She has history of hypothyroidism for which she takes Synthroid.  She has had prior , bilateral breast excisional biopsies, carpal tunnel syndrome surgery, uterine ablation, dermoid cyst excision.\par \par She reports family history of breast cancer in her mother age 63 for which she underwent a lumpectomy followed by tamoxifen for 7 years.  Cancer recurred when the patient was in her late 60s and the patient underwent a mastectomy.  She is still alive and well, with dementia.  Maternal cousin twice removed, with breast cancer.

## 2022-07-06 ENCOUNTER — NON-APPOINTMENT (OUTPATIENT)
Age: 62
End: 2022-07-06

## 2022-08-10 ENCOUNTER — OUTPATIENT (OUTPATIENT)
Dept: OUTPATIENT SERVICES | Facility: HOSPITAL | Age: 62
LOS: 1 days | End: 2022-08-10

## 2022-08-10 ENCOUNTER — OUTPATIENT (OUTPATIENT)
Dept: OUTPATIENT SERVICES | Facility: HOSPITAL | Age: 62
LOS: 1 days | End: 2022-08-10
Payer: COMMERCIAL

## 2022-08-10 ENCOUNTER — RESULT REVIEW (OUTPATIENT)
Age: 62
End: 2022-08-10

## 2022-08-10 ENCOUNTER — APPOINTMENT (OUTPATIENT)
Dept: ULTRASOUND IMAGING | Facility: IMAGING CENTER | Age: 62
End: 2022-08-10

## 2022-08-10 ENCOUNTER — APPOINTMENT (OUTPATIENT)
Dept: MAMMOGRAPHY | Facility: IMAGING CENTER | Age: 62
End: 2022-08-10

## 2022-08-10 DIAGNOSIS — Z98.890 OTHER SPECIFIED POSTPROCEDURAL STATES: Chronic | ICD-10-CM

## 2022-08-10 DIAGNOSIS — Z87.59 PERSONAL HISTORY OF OTHER COMPLICATIONS OF PREGNANCY, CHILDBIRTH AND THE PUERPERIUM: Chronic | ICD-10-CM

## 2022-08-10 DIAGNOSIS — Z98.891 HISTORY OF UTERINE SCAR FROM PREVIOUS SURGERY: Chronic | ICD-10-CM

## 2022-08-10 DIAGNOSIS — Z00.8 ENCOUNTER FOR OTHER GENERAL EXAMINATION: ICD-10-CM

## 2022-08-10 PROCEDURE — 76641 ULTRASOUND BREAST COMPLETE: CPT

## 2022-08-10 PROCEDURE — 77067 SCR MAMMO BI INCL CAD: CPT

## 2022-08-10 PROCEDURE — 77063 BREAST TOMOSYNTHESIS BI: CPT | Mod: 26

## 2022-08-10 PROCEDURE — 77067 SCR MAMMO BI INCL CAD: CPT | Mod: 26

## 2022-08-10 PROCEDURE — 77063 BREAST TOMOSYNTHESIS BI: CPT

## 2022-08-10 PROCEDURE — 76641 ULTRASOUND BREAST COMPLETE: CPT | Mod: 26,50

## 2022-09-30 ENCOUNTER — APPOINTMENT (OUTPATIENT)
Dept: OBGYN | Facility: CLINIC | Age: 62
End: 2022-09-30

## 2022-09-30 ENCOUNTER — ASOB RESULT (OUTPATIENT)
Age: 62
End: 2022-09-30

## 2022-09-30 VITALS
DIASTOLIC BLOOD PRESSURE: 84 MMHG | BODY MASS INDEX: 28.12 KG/M2 | HEIGHT: 66 IN | SYSTOLIC BLOOD PRESSURE: 150 MMHG | WEIGHT: 175 LBS

## 2022-09-30 PROCEDURE — 76856 US EXAM PELVIC COMPLETE: CPT | Mod: 59

## 2022-09-30 PROCEDURE — 82270 OCCULT BLOOD FECES: CPT

## 2022-09-30 PROCEDURE — 99396 PREV VISIT EST AGE 40-64: CPT

## 2022-09-30 PROCEDURE — 76830 TRANSVAGINAL US NON-OB: CPT

## 2022-10-02 LAB — HPV HIGH+LOW RISK DNA PNL CVX: NOT DETECTED

## 2022-10-05 ENCOUNTER — NON-APPOINTMENT (OUTPATIENT)
Age: 62
End: 2022-10-05

## 2022-10-07 LAB — CYTOLOGY CVX/VAG DOC THIN PREP: ABNORMAL

## 2022-10-21 ENCOUNTER — NON-APPOINTMENT (OUTPATIENT)
Age: 62
End: 2022-10-21

## 2022-10-21 ENCOUNTER — APPOINTMENT (OUTPATIENT)
Dept: PULMONOLOGY | Facility: CLINIC | Age: 62
End: 2022-10-21

## 2022-10-21 VITALS
DIASTOLIC BLOOD PRESSURE: 80 MMHG | HEIGHT: 66 IN | OXYGEN SATURATION: 98 % | SYSTOLIC BLOOD PRESSURE: 132 MMHG | RESPIRATION RATE: 16 BRPM | TEMPERATURE: 98.2 F | HEART RATE: 88 BPM

## 2022-10-21 PROCEDURE — 99203 OFFICE O/P NEW LOW 30 MIN: CPT

## 2022-10-21 NOTE — ASSESSMENT
[FreeTextEntry1] : Ms. Campos is a 62-year-old, non-smoking, female.  She has past medical history of Hypothyroidism, Pre-cancer of breast polyp was on Tamoxifen (discontinued),  & deviated septum. Her chief concern is " my boyfriend said I snore like a buzz saw".\par \par 1. Loud Snoring:\par - HST RX'ed to evaluate for GARRETT. \par - I have discussed all the negative health consequences associated with obstructive and central sleep apnea including heart conditions/MI, hypertension, diabetes, chronic inflammation, memory issues, stroke, obesity, decreased libido, sleep related accidents, as well as anxiety and depression. \par - Additional recommendations included: Avoid alcohol and sedatives at bedtime. Proper sleep hygiene such as maintaining a regular sleep routine, avoiding naps if possible, not watching TV or reading in bed,  and maintaining a quiet, comfortable bedroom. Sleepy driving avoidance and risks discussed with patient. \par - Diet, exercise and weight loss suggested.\par \par Patient to follow up post HST. \par Patient to call with further questions and concerns.\par Patient verbalizes understanding of care plan and is agreeable.\par

## 2022-10-21 NOTE — HISTORY OF PRESENT ILLNESS
[Never] : never [TextBox_4] : Ms. Campos is a 62-year-old, non-smoking, female.  She has past medical history of Hypothyroidism, Pre-cancer of breast polyp was on Tamoxifen (discontinued),  & deviated septum. \par \par Her chief concern is " my boyfriend said I snore like a buzz saw".\par \par She states last week her boyfriend had witnessed apneas, which was of great concern to her. \par She states she now uses Breathe Right nasal strips, which she has helped. She is unsure what sleep position she spends the most time in.\par She was awakening with morning headache, but attributes this to thyroid not being well managed.  Since increase in Synthroid medication the morning headaches have stopped. She note she does awaken with dry mouth. She sleeps from 8:30 PM -4:30 AM and awakens once per night. She admits to fatigue, but attributes it to increased life stressors.  He takes care of and lives with her 91-year-old mother who has dementia. \par \par She denies any pulmonary concerns at this time. \par

## 2022-10-26 DIAGNOSIS — Z20.822 CONTACT WITH AND (SUSPECTED) EXPOSURE TO COVID-19: ICD-10-CM

## 2022-12-09 LAB — SARS-COV-2 N GENE NPH QL NAA+PROBE: NOT DETECTED

## 2022-12-26 ENCOUNTER — NON-APPOINTMENT (OUTPATIENT)
Age: 62
End: 2022-12-26

## 2022-12-28 ENCOUNTER — NON-APPOINTMENT (OUTPATIENT)
Age: 62
End: 2022-12-28

## 2022-12-29 ENCOUNTER — APPOINTMENT (OUTPATIENT)
Dept: PULMONOLOGY | Facility: CLINIC | Age: 62
End: 2022-12-29

## 2023-01-25 ENCOUNTER — NON-APPOINTMENT (OUTPATIENT)
Age: 63
End: 2023-01-25

## 2023-01-31 ENCOUNTER — OUTPATIENT (OUTPATIENT)
Dept: OUTPATIENT SERVICES | Facility: HOSPITAL | Age: 63
LOS: 1 days | End: 2023-01-31
Payer: COMMERCIAL

## 2023-01-31 ENCOUNTER — APPOINTMENT (OUTPATIENT)
Dept: MRI IMAGING | Facility: CLINIC | Age: 63
End: 2023-01-31
Payer: COMMERCIAL

## 2023-01-31 DIAGNOSIS — Z98.890 OTHER SPECIFIED POSTPROCEDURAL STATES: Chronic | ICD-10-CM

## 2023-01-31 DIAGNOSIS — N60.99 UNSPECIFIED BENIGN MAMMARY DYSPLASIA OF UNSPECIFIED BREAST: ICD-10-CM

## 2023-01-31 DIAGNOSIS — Z98.891 HISTORY OF UTERINE SCAR FROM PREVIOUS SURGERY: Chronic | ICD-10-CM

## 2023-01-31 DIAGNOSIS — Z87.59 PERSONAL HISTORY OF OTHER COMPLICATIONS OF PREGNANCY, CHILDBIRTH AND THE PUERPERIUM: Chronic | ICD-10-CM

## 2023-01-31 PROCEDURE — A9585: CPT

## 2023-01-31 PROCEDURE — C8937: CPT

## 2023-01-31 PROCEDURE — 77049 MRI BREAST C-+ W/CAD BI: CPT | Mod: 26

## 2023-01-31 PROCEDURE — C8908: CPT

## 2023-03-17 ENCOUNTER — TRANSCRIPTION ENCOUNTER (OUTPATIENT)
Age: 63
End: 2023-03-17

## 2023-03-27 PROBLEM — R59.0 ENLARGED LYMPH NODES IN ARMPIT: Status: ACTIVE | Noted: 2023-03-27

## 2023-04-03 ENCOUNTER — APPOINTMENT (OUTPATIENT)
Dept: SURGICAL ONCOLOGY | Facility: CLINIC | Age: 63
End: 2023-04-03
Payer: COMMERCIAL

## 2023-04-03 ENCOUNTER — APPOINTMENT (OUTPATIENT)
Dept: SURGICAL ONCOLOGY | Facility: CLINIC | Age: 63
End: 2023-04-03

## 2023-04-03 VITALS
HEART RATE: 102 BPM | OXYGEN SATURATION: 96 % | SYSTOLIC BLOOD PRESSURE: 159 MMHG | BODY MASS INDEX: 28.93 KG/M2 | WEIGHT: 180 LBS | HEIGHT: 66 IN | DIASTOLIC BLOOD PRESSURE: 85 MMHG | RESPIRATION RATE: 16 BRPM

## 2023-04-03 DIAGNOSIS — R59.0 LOCALIZED ENLARGED LYMPH NODES: ICD-10-CM

## 2023-04-03 DIAGNOSIS — Z91.89 OTHER SPECIFIED PERSONAL RISK FACTORS, NOT ELSEWHERE CLASSIFIED: ICD-10-CM

## 2023-04-03 PROCEDURE — 99213 OFFICE O/P EST LOW 20 MIN: CPT

## 2023-04-03 NOTE — HISTORY OF PRESENT ILLNESS
[FreeTextEntry1] : The patient is a 62 year old female referred for consultation by Dr. Sury Fuentes for high risk evaluation here for a follow up visit.\par \par Prior History:\par The patient reports having history of a R breast excisional biopsy done in  showing ALH. She has also had a Left breast excisional biopsy in 2016 that was benign. For the atypia, the patient was put on tamoxifen, which she had significant side effects with (hot flashes, mood swings), and she was not very compliant. This was switched to raloxifene after menopause in . She completed 5 years of treatment, and stopped in .\par \par Most recent imaging:\par \par 2021 MRI\par  - Neg, BR2\par \par 2021 B/L SM (NW), TC 24.6%, heterogeneously dense breasts\par – B/L postoperative changes\par – R anterior central outer questionable calcifications lateral to clip–>DM\par – L negative\par \par 2021 B/L US\par – R scattered cysts up to 3 mm\par – L 3:00 N12 nonspecific lymph node up to 6 mm\par – BR0\par \par 2021 R DM\par – R breast postsurgical changes\par –Could not replicate calcifications, consistent with artifact\par – BR2\par \par During initial visit, the patient reports no breast complaints. Denies pain, masses, skin changes, or nipple discharge. \par \par She has history of hypothyroidism for which she takes Synthroid.  She has had prior , bilateral breast excisional biopsies, carpal tunnel syndrome surgery, uterine ablation, dermoid cyst excision.\par \par She reports family history of breast cancer in her mother age 63 for which she underwent a lumpectomy followed by tamoxifen for 7 years.  Cancer recurred when the patient was in her late 60s and the patient underwent a mastectomy.  She is still alive and well, with dementia.  Maternal cousin twice removed, with breast cancer.\par \par 8/10/2022 B/L SM (NW) TC 18.6%\par - R central bx marker\par - B/L scattered calcs and nodular fibroglandular breast pattern are stable\par \par 8/10/2022 B/L US\par - R 12:00 N5: 3 x 2 x 3 mm cyst, stable\par - L 3:00 N12: subcm intramammary LN w/o suspicious features \par - BR2\par \par 2023 MRI (NW)\par - R upper central to slightly inner: bx clip\par - L stable postsurgical changes\par - L previously reported mildly prominent LNs are w/o significant change-> f/u clinically\par - R hepatic lobe: subcm T2 hyperintense lesion again noted presume to represent hepatic cyst\par - BR2\par \par Interval History: \par Left foot fracture after fall at home, doing better now. No breast complaints today--no masses, skin changes or nipple discharge.

## 2023-04-03 NOTE — ASSESSMENT
[FreeTextEntry1] : The patient is a 62 year old female with history of R breast excisional biopsy showing ALH, here for a follow up visit.\par \par Recent imaging revealed mildly prominent left intramammary LNs, otherwise unremarkable; BR2.\par \par Exam today stable- no masses or skin changes\par \par  Plan:\par  - Next mmg 8/2023 B/L SM and US\par  - Next MRI 1/2025\par  - RTO 1 year

## 2023-04-03 NOTE — CONSULT LETTER
[Dear  ___] : Dear  [unfilled], [Courtesy Letter:] : I had the pleasure of seeing your patient, [unfilled], in my office today. [Please see my note below.] : Please see my note below. [Consult Closing:] : Thank you very much for allowing me to participate in the care of this patient.  If you have any questions, please do not hesitate to contact me. [Sincerely,] : Sincerely, [DrAshanti  ___] : Dr. JOHN [FreeTextEntry3] : Mikaela Potter MD\par Breast Surgeon\par Division of Surgical Oncology\par Department of Surgery\par [address] [place] [address]\[place] [address] \par Tel: [phone]\par Fax: [phone]\par Email: [email or web address]

## 2023-04-03 NOTE — PHYSICAL EXAM
[Normocephalic] : normocephalic [Atraumatic] : atraumatic [EOMI] : extra ocular movement intact [PERRL] : pupils equal, round and reactive to light [Sclera nonicteric] : sclera nonicteric [Supple] : supple [No Supraclavicular Adenopathy] : no supraclavicular adenopathy [No Thyromegaly] : no thyromegaly [Examined in the supine and seated position] : examined in the supine and seated position [Bra Size: ___] : Bra Size: [unfilled] [Grade 1] : Ptosis Grade 1 [No dominant masses] : no dominant masses in right breast  [No dominant masses] : no dominant masses left breast [No Nipple Retraction] : no left nipple retraction [No Nipple Discharge] : no left nipple discharge [Breast Mass Right Breast ___cm] : no masses [Breast Mass Left Breast ___cm] : no masses [No Axillary Lymphadenopathy] : no left axillary lymphadenopathy [No Edema] : no edema [No Rashes] : no rashes [No Ulceration] : no ulceration [Breast Nipple Inversion] : nipples not inverted [Breast Nipple Retraction] : nipples not retracted [Breast Nipple Flattening] : nipples not flattened [Breast Nipple Fissures] : nipples not fissured [Breast Abnormal Lactation (Galactorrhea)] : no galactorrhea [Breast Abnormal Secretion Bloody Fluid] : no bloody discharge [Breast Abnormal Secretion Serous Fluid] : no serous discharge [Breast Abnormal Secretion Opalescent Fluid] : no milky discharge [de-identified] : Left posterior cervical mass, stable - per pt, has been present for 40 years [de-identified] : non-labored respirations  [de-identified] : well-healed superior circumareolar incision [de-identified] : well-healed inferior circumareolar incision

## 2023-04-24 ENCOUNTER — APPOINTMENT (OUTPATIENT)
Dept: INTERNAL MEDICINE | Facility: CLINIC | Age: 63
End: 2023-04-24

## 2023-07-20 ENCOUNTER — FORM ENCOUNTER (OUTPATIENT)
Age: 63
End: 2023-07-20

## 2023-07-21 ENCOUNTER — OUTPATIENT (OUTPATIENT)
Dept: OUTPATIENT SERVICES | Facility: HOSPITAL | Age: 63
LOS: 1 days | End: 2023-07-21
Payer: COMMERCIAL

## 2023-07-21 ENCOUNTER — APPOINTMENT (OUTPATIENT)
Dept: SLEEP CENTER | Facility: CLINIC | Age: 63
End: 2023-07-21
Payer: COMMERCIAL

## 2023-07-21 DIAGNOSIS — Z98.890 OTHER SPECIFIED POSTPROCEDURAL STATES: Chronic | ICD-10-CM

## 2023-07-21 DIAGNOSIS — Z87.59 PERSONAL HISTORY OF OTHER COMPLICATIONS OF PREGNANCY, CHILDBIRTH AND THE PUERPERIUM: Chronic | ICD-10-CM

## 2023-07-21 DIAGNOSIS — Z98.891 HISTORY OF UTERINE SCAR FROM PREVIOUS SURGERY: Chronic | ICD-10-CM

## 2023-07-21 PROCEDURE — 95800 SLP STDY UNATTENDED: CPT

## 2023-07-21 PROCEDURE — 95800 SLP STDY UNATTENDED: CPT | Mod: 26

## 2023-07-26 ENCOUNTER — NON-APPOINTMENT (OUTPATIENT)
Age: 63
End: 2023-07-26

## 2023-07-27 ENCOUNTER — APPOINTMENT (OUTPATIENT)
Dept: PULMONOLOGY | Facility: CLINIC | Age: 63
End: 2023-07-27
Payer: COMMERCIAL

## 2023-07-27 DIAGNOSIS — G47.33 OBSTRUCTIVE SLEEP APNEA (ADULT) (PEDIATRIC): ICD-10-CM

## 2023-07-27 PROCEDURE — 99442: CPT

## 2023-08-06 ENCOUNTER — NON-APPOINTMENT (OUTPATIENT)
Age: 63
End: 2023-08-06

## 2023-08-21 ENCOUNTER — APPOINTMENT (OUTPATIENT)
Dept: OTOLARYNGOLOGY | Facility: CLINIC | Age: 63
End: 2023-08-21
Payer: COMMERCIAL

## 2023-08-21 VITALS
HEIGHT: 66 IN | HEART RATE: 89 BPM | BODY MASS INDEX: 28.93 KG/M2 | WEIGHT: 180 LBS | SYSTOLIC BLOOD PRESSURE: 138 MMHG | DIASTOLIC BLOOD PRESSURE: 83 MMHG

## 2023-08-21 DIAGNOSIS — J31.0 CHRONIC RHINITIS: ICD-10-CM

## 2023-08-21 DIAGNOSIS — H90.5 UNSPECIFIED SENSORINEURAL HEARING LOSS: ICD-10-CM

## 2023-08-21 PROCEDURE — 99203 OFFICE O/P NEW LOW 30 MIN: CPT | Mod: 25

## 2023-08-21 PROCEDURE — 92557 COMPREHENSIVE HEARING TEST: CPT

## 2023-08-21 PROCEDURE — 92625 TINNITUS ASSESSMENT: CPT

## 2023-08-21 PROCEDURE — 92567 TYMPANOMETRY: CPT

## 2023-08-21 RX ORDER — ESTRADIOL 0.1 MG/G
0.1 CREAM VAGINAL
Qty: 1 | Refills: 1 | Status: COMPLETED | COMMUNITY
Start: 2021-09-13 | End: 2023-08-21

## 2023-08-21 NOTE — HISTORY OF PRESENT ILLNESS
[de-identified] : 63 year old female presents for initial evaluation for tinnitus. Reports constant tinnitus bilaterally for about 1 year. States tinnitus is louder at night in quiet rooms. No vertigo - no family hx of HL - Patient describes tinnitus as a "high pitch" beeping. States she is able to ignore the sound during the day due to distractions. History of vertigo secondary to head concussion (30+ years ago)-nothing recent. No concerns for changes in hearing, difficulty hearing people speaking when they are not directly in front of her. No hearing aids at this time. No family history of Meniere's disease or autoimmune diseases. No injury to ears or exposure to loud sounds. Does prevent sleep - tries tylenol PM - also lipoflavinod - no help-

## 2023-09-16 ENCOUNTER — RESULT REVIEW (OUTPATIENT)
Age: 63
End: 2023-09-16

## 2023-09-16 ENCOUNTER — APPOINTMENT (OUTPATIENT)
Dept: RADIOLOGY | Facility: CLINIC | Age: 63
End: 2023-09-16
Payer: COMMERCIAL

## 2023-09-16 ENCOUNTER — APPOINTMENT (OUTPATIENT)
Dept: ULTRASOUND IMAGING | Facility: CLINIC | Age: 63
End: 2023-09-16
Payer: COMMERCIAL

## 2023-09-16 ENCOUNTER — APPOINTMENT (OUTPATIENT)
Dept: MAMMOGRAPHY | Facility: CLINIC | Age: 63
End: 2023-09-16
Payer: COMMERCIAL

## 2023-09-16 PROCEDURE — 71046 X-RAY EXAM CHEST 2 VIEWS: CPT

## 2023-09-16 PROCEDURE — 77067 SCR MAMMO BI INCL CAD: CPT

## 2023-09-16 PROCEDURE — 76641 ULTRASOUND BREAST COMPLETE: CPT | Mod: 50

## 2023-09-16 PROCEDURE — 77063 BREAST TOMOSYNTHESIS BI: CPT

## 2023-09-22 ENCOUNTER — NON-APPOINTMENT (OUTPATIENT)
Age: 63
End: 2023-09-22

## 2023-10-11 ENCOUNTER — TRANSCRIPTION ENCOUNTER (OUTPATIENT)
Age: 63
End: 2023-10-11

## 2023-10-12 ENCOUNTER — TRANSCRIPTION ENCOUNTER (OUTPATIENT)
Age: 63
End: 2023-10-12

## 2023-10-13 ENCOUNTER — OUTPATIENT (OUTPATIENT)
Dept: OUTPATIENT SERVICES | Facility: HOSPITAL | Age: 63
LOS: 1 days | End: 2023-10-13
Payer: COMMERCIAL

## 2023-10-13 ENCOUNTER — APPOINTMENT (OUTPATIENT)
Dept: SLEEP CENTER | Facility: CLINIC | Age: 63
End: 2023-10-13
Payer: COMMERCIAL

## 2023-10-13 DIAGNOSIS — Z87.59 PERSONAL HISTORY OF OTHER COMPLICATIONS OF PREGNANCY, CHILDBIRTH AND THE PUERPERIUM: Chronic | ICD-10-CM

## 2023-10-13 DIAGNOSIS — Z98.891 HISTORY OF UTERINE SCAR FROM PREVIOUS SURGERY: Chronic | ICD-10-CM

## 2023-10-13 DIAGNOSIS — Z98.890 OTHER SPECIFIED POSTPROCEDURAL STATES: Chronic | ICD-10-CM

## 2023-10-13 PROCEDURE — G0400: CPT

## 2023-10-13 PROCEDURE — G0400: CPT | Mod: 26

## 2023-10-17 ENCOUNTER — NON-APPOINTMENT (OUTPATIENT)
Age: 63
End: 2023-10-17

## 2023-10-17 DIAGNOSIS — G47.33 OBSTRUCTIVE SLEEP APNEA (ADULT) (PEDIATRIC): ICD-10-CM

## 2023-10-20 ENCOUNTER — APPOINTMENT (OUTPATIENT)
Dept: OBGYN | Facility: CLINIC | Age: 63
End: 2023-10-20
Payer: COMMERCIAL

## 2023-10-20 ENCOUNTER — ASOB RESULT (OUTPATIENT)
Age: 63
End: 2023-10-20

## 2023-10-20 VITALS
BODY MASS INDEX: 28.93 KG/M2 | DIASTOLIC BLOOD PRESSURE: 79 MMHG | WEIGHT: 180 LBS | SYSTOLIC BLOOD PRESSURE: 138 MMHG | HEIGHT: 66 IN

## 2023-10-20 DIAGNOSIS — N90.5 ATROPHY OF VULVA: ICD-10-CM

## 2023-10-20 DIAGNOSIS — Z01.411 ENCOUNTER FOR GYNECOLOGICAL EXAMINATION (GENERAL) (ROUTINE) WITH ABNORMAL FINDINGS: ICD-10-CM

## 2023-10-20 DIAGNOSIS — N95.2 POSTMENOPAUSAL ATROPHIC VAGINITIS: ICD-10-CM

## 2023-10-20 PROCEDURE — 99397 PER PM REEVAL EST PAT 65+ YR: CPT

## 2023-10-20 PROCEDURE — 82270 OCCULT BLOOD FECES: CPT

## 2023-10-21 LAB
ALBUMIN SERPL ELPH-MCNC: 4.8 G/DL
ALP BLD-CCNC: 106 U/L
ALT SERPL-CCNC: 20 U/L
ANION GAP SERPL CALC-SCNC: 16 MMOL/L
AST SERPL-CCNC: 19 U/L
BASOPHILS # BLD AUTO: 0.06 K/UL
BASOPHILS NFR BLD AUTO: 0.7 %
BILIRUB SERPL-MCNC: 0.4 MG/DL
BUN SERPL-MCNC: 13 MG/DL
CALCIUM SERPL-MCNC: 9.4 MG/DL
CHLORIDE SERPL-SCNC: 99 MMOL/L
CO2 SERPL-SCNC: 26 MMOL/L
CREAT SERPL-MCNC: 0.62 MG/DL
EGFR: 99 ML/MIN/1.73M2
EOSINOPHIL # BLD AUTO: 0.21 K/UL
EOSINOPHIL NFR BLD AUTO: 2.5 %
GLUCOSE SERPL-MCNC: 56 MG/DL
HCT VFR BLD CALC: 44.1 %
HGB BLD-MCNC: 13.5 G/DL
HPV HIGH+LOW RISK DNA PNL CVX: NOT DETECTED
IMM GRANULOCYTES NFR BLD AUTO: 0.2 %
LYMPHOCYTES # BLD AUTO: 3.05 K/UL
LYMPHOCYTES NFR BLD AUTO: 36.9 %
MAN DIFF?: NORMAL
MCHC RBC-ENTMCNC: 27 PG
MCHC RBC-ENTMCNC: 30.6 GM/DL
MCV RBC AUTO: 88.2 FL
MONOCYTES # BLD AUTO: 0.56 K/UL
MONOCYTES NFR BLD AUTO: 6.8 %
NEUTROPHILS # BLD AUTO: 4.37 K/UL
NEUTROPHILS NFR BLD AUTO: 52.9 %
PLATELET # BLD AUTO: 251 K/UL
POTASSIUM SERPL-SCNC: 4.4 MMOL/L
PROT SERPL-MCNC: 7.4 G/DL
RBC # BLD: 5 M/UL
RBC # FLD: 14.3 %
SODIUM SERPL-SCNC: 142 MMOL/L
TSH SERPL-ACNC: 3.03 UIU/ML
WBC # FLD AUTO: 8.27 K/UL

## 2023-10-23 ENCOUNTER — NON-APPOINTMENT (OUTPATIENT)
Age: 63
End: 2023-10-23

## 2023-10-23 LAB
25(OH)D3 SERPL-MCNC: 19.1 NG/ML
CHOLEST SERPL-MCNC: 222 MG/DL
ESTIMATED AVERAGE GLUCOSE: 123 MG/DL
HBA1C MFR BLD HPLC: 5.9 %
HDLC SERPL-MCNC: 72 MG/DL
LDLC SERPL CALC-MCNC: 131 MG/DL
NONHDLC SERPL-MCNC: 150 MG/DL
T4 FREE SERPL-MCNC: 1.8 NG/DL
TRIGL SERPL-MCNC: 108 MG/DL

## 2023-10-23 RX ORDER — CHOLECALCIFEROL (VITAMIN D3) 1250 MCG
1.25 MG CAPSULE ORAL
Qty: 8 | Refills: 1 | Status: ACTIVE | COMMUNITY
Start: 2023-10-23 | End: 1900-01-01

## 2023-10-24 LAB — CYTOLOGY CVX/VAG DOC THIN PREP: NORMAL

## 2023-10-25 ENCOUNTER — APPOINTMENT (OUTPATIENT)
Dept: PULMONOLOGY | Facility: CLINIC | Age: 63
End: 2023-10-25

## 2023-11-09 ENCOUNTER — NON-APPOINTMENT (OUTPATIENT)
Age: 63
End: 2023-11-09

## 2023-11-10 ENCOUNTER — APPOINTMENT (OUTPATIENT)
Dept: OTOLARYNGOLOGY | Facility: CLINIC | Age: 63
End: 2023-11-10
Payer: COMMERCIAL

## 2023-11-10 VITALS
BODY MASS INDEX: 30.53 KG/M2 | DIASTOLIC BLOOD PRESSURE: 84 MMHG | SYSTOLIC BLOOD PRESSURE: 155 MMHG | WEIGHT: 190 LBS | HEIGHT: 66 IN | HEART RATE: 102 BPM

## 2023-11-10 DIAGNOSIS — J34.2 DEVIATED NASAL SEPTUM: ICD-10-CM

## 2023-11-10 DIAGNOSIS — Z98.890 OTHER SPECIFIED POSTPROCEDURAL STATES: ICD-10-CM

## 2023-11-10 DIAGNOSIS — Z20.822 CONTACT WITH AND (SUSPECTED) EXPOSURE TO COVID-19: ICD-10-CM

## 2023-11-10 DIAGNOSIS — H93.13 TINNITUS, BILATERAL: ICD-10-CM

## 2023-11-10 DIAGNOSIS — Z80.3 FAMILY HISTORY OF MALIGNANT NEOPLASM OF BREAST: ICD-10-CM

## 2023-11-10 DIAGNOSIS — R06.83 SNORING: ICD-10-CM

## 2023-11-10 PROCEDURE — 31575 DIAGNOSTIC LARYNGOSCOPY: CPT

## 2023-11-10 PROCEDURE — 99204 OFFICE O/P NEW MOD 45 MIN: CPT | Mod: 25,57

## 2023-11-10 RX ORDER — FLUTICASONE PROPIONATE 50 UG/1
50 SPRAY, METERED NASAL
Qty: 3 | Refills: 3 | Status: COMPLETED | COMMUNITY
Start: 2023-08-21 | End: 2023-11-10

## 2023-11-10 RX ORDER — LEVOTHYROXINE SODIUM 175 UG/1
175 TABLET ORAL
Refills: 0 | Status: ACTIVE | COMMUNITY

## 2023-11-10 RX ORDER — BUTALBITAL, ACETAMINOPHEN AND CAFFEINE 300; 50; 40 MG/1; MG/1; MG/1
50-300-40 CAPSULE ORAL
Qty: 90 | Refills: 1 | Status: COMPLETED | COMMUNITY
Start: 2021-09-13 | End: 2023-11-10

## 2023-12-12 LAB — 25(OH)D3 SERPL-MCNC: 45 NG/ML

## 2023-12-14 ENCOUNTER — APPOINTMENT (OUTPATIENT)
Dept: OTOLARYNGOLOGY | Facility: CLINIC | Age: 63
End: 2023-12-14
Payer: COMMERCIAL

## 2023-12-14 VITALS — HEIGHT: 66 IN | WEIGHT: 190 LBS | BODY MASS INDEX: 30.53 KG/M2

## 2023-12-14 DIAGNOSIS — H93.13 TINNITUS, BILATERAL: ICD-10-CM

## 2023-12-14 DIAGNOSIS — H90.3 SENSORINEURAL HEARING LOSS, BILATERAL: ICD-10-CM

## 2023-12-14 DIAGNOSIS — M26.623 ARTHRALGIA OF BILATERAL TEMPOROMANDIBULAR JOINT: ICD-10-CM

## 2023-12-14 PROCEDURE — 99213 OFFICE O/P EST LOW 20 MIN: CPT

## 2023-12-14 PROCEDURE — 92504 EAR MICROSCOPY EXAMINATION: CPT

## 2023-12-14 NOTE — HISTORY OF PRESENT ILLNESS
[de-identified] : 63 year old female presents for b/l tinnitus  Patient of Dr. Flores, due for inspire implant and septoplasty  Previously seen by Dr. Kelly but did not get any resolution Has had constant b/l tinnitus for the last 2 years - no ear/head trauma, no loud noise exposure Less noticeable during the day, worse when she gets home Has severe GARRETT, sleep is often interrupted  Has constant right ear discomfort Denies otorrhea, ear infections, hearing loss, dizziness, vertigo, headaches related to hearing.

## 2023-12-14 NOTE — DATA REVIEWED
[de-identified] : I have independently reviewed the patient's audiogram from dr newell visit and my findings include Hf SNHL and TPM to 3k (at inflection)

## 2023-12-14 NOTE — PHYSICAL EXAM
[Binocular Microscopic Exam] : Binocular microscopic exam was performed [Hearing Gutierrez Test (Tuning Fork On Forehead)] : no lateralization of tone [Normal] : temporomandibular joint is normal [de-identified] : +sig TTP [Hearing Loss Right Only] : normal [Hearing Loss Left Only] : normal [Rinne Test Air Conduction Persists > Bone Conduction Left] : bone conduction greater than air conduction on the left [Rinne Test Air Conduction Persists > Bone Conduction Right] : bone conduction greater than air conduction on the right [Nystagmus] : ~T no ~M nystagmus was seen [Fukuda Step Test] : Fukuda Step Test was Negative [Romberg's Sign] : Romberg's sign was absent [Fistula Sign] : Fistula Sign: Negative [Past-Pointing] : Past-Pointing: Negative [Abimbola-Hallalanke] : Frost-Hallpike: Negative

## 2023-12-14 NOTE — PROCEDURE
[Other ___] : [unfilled] [Same] : same as the Pre Op Dx. [] : Binocular Microscopy [FreeTextEntry1] : tinnitus [FreeTextEntry4] : none [FreeTextEntry6] : Operative microscope was used to examine the ear canal, ear drum and visible middle ear landmarks. Adequate exam would not have been possible without the use of a microscope. Findings are described.

## 2023-12-21 ENCOUNTER — APPOINTMENT (OUTPATIENT)
Dept: PHARMACY | Facility: CLINIC | Age: 63
End: 2023-12-21

## 2023-12-26 ENCOUNTER — OUTPATIENT (OUTPATIENT)
Dept: OUTPATIENT SERVICES | Facility: HOSPITAL | Age: 63
LOS: 1 days | End: 2023-12-26
Payer: COMMERCIAL

## 2023-12-26 VITALS
DIASTOLIC BLOOD PRESSURE: 78 MMHG | WEIGHT: 198.42 LBS | RESPIRATION RATE: 16 BRPM | SYSTOLIC BLOOD PRESSURE: 118 MMHG | HEIGHT: 65 IN | TEMPERATURE: 98 F | HEART RATE: 92 BPM | OXYGEN SATURATION: 97 %

## 2023-12-26 DIAGNOSIS — Z98.891 HISTORY OF UTERINE SCAR FROM PREVIOUS SURGERY: Chronic | ICD-10-CM

## 2023-12-26 DIAGNOSIS — J34.2 DEVIATED NASAL SEPTUM: ICD-10-CM

## 2023-12-26 DIAGNOSIS — G47.33 OBSTRUCTIVE SLEEP APNEA (ADULT) (PEDIATRIC): ICD-10-CM

## 2023-12-26 DIAGNOSIS — E03.9 HYPOTHYROIDISM, UNSPECIFIED: ICD-10-CM

## 2023-12-26 DIAGNOSIS — Z01.818 ENCOUNTER FOR OTHER PREPROCEDURAL EXAMINATION: ICD-10-CM

## 2023-12-26 DIAGNOSIS — Z87.59 PERSONAL HISTORY OF OTHER COMPLICATIONS OF PREGNANCY, CHILDBIRTH AND THE PUERPERIUM: Chronic | ICD-10-CM

## 2023-12-26 DIAGNOSIS — Z98.890 OTHER SPECIFIED POSTPROCEDURAL STATES: Chronic | ICD-10-CM

## 2023-12-26 LAB
ANION GAP SERPL CALC-SCNC: 6 MMOL/L — SIGNIFICANT CHANGE UP (ref 5–17)
ANION GAP SERPL CALC-SCNC: 6 MMOL/L — SIGNIFICANT CHANGE UP (ref 5–17)
BUN SERPL-MCNC: 12 MG/DL — SIGNIFICANT CHANGE UP (ref 7–23)
BUN SERPL-MCNC: 12 MG/DL — SIGNIFICANT CHANGE UP (ref 7–23)
CALCIUM SERPL-MCNC: 9.2 MG/DL — SIGNIFICANT CHANGE UP (ref 8.4–10.5)
CALCIUM SERPL-MCNC: 9.2 MG/DL — SIGNIFICANT CHANGE UP (ref 8.4–10.5)
CHLORIDE SERPL-SCNC: 102 MMOL/L — SIGNIFICANT CHANGE UP (ref 96–108)
CHLORIDE SERPL-SCNC: 102 MMOL/L — SIGNIFICANT CHANGE UP (ref 96–108)
CO2 SERPL-SCNC: 29 MMOL/L — SIGNIFICANT CHANGE UP (ref 22–31)
CO2 SERPL-SCNC: 29 MMOL/L — SIGNIFICANT CHANGE UP (ref 22–31)
CREAT SERPL-MCNC: 0.61 MG/DL — SIGNIFICANT CHANGE UP (ref 0.5–1.3)
CREAT SERPL-MCNC: 0.61 MG/DL — SIGNIFICANT CHANGE UP (ref 0.5–1.3)
EGFR: 99 ML/MIN/1.73M2 — SIGNIFICANT CHANGE UP
EGFR: 99 ML/MIN/1.73M2 — SIGNIFICANT CHANGE UP
GLUCOSE SERPL-MCNC: 91 MG/DL — SIGNIFICANT CHANGE UP (ref 70–99)
GLUCOSE SERPL-MCNC: 91 MG/DL — SIGNIFICANT CHANGE UP (ref 70–99)
HCT VFR BLD CALC: 43.4 % — SIGNIFICANT CHANGE UP (ref 34.5–45)
HCT VFR BLD CALC: 43.4 % — SIGNIFICANT CHANGE UP (ref 34.5–45)
HGB BLD-MCNC: 13.7 G/DL — SIGNIFICANT CHANGE UP (ref 11.5–15.5)
HGB BLD-MCNC: 13.7 G/DL — SIGNIFICANT CHANGE UP (ref 11.5–15.5)
MCHC RBC-ENTMCNC: 26.8 PG — LOW (ref 27–34)
MCHC RBC-ENTMCNC: 26.8 PG — LOW (ref 27–34)
MCHC RBC-ENTMCNC: 31.6 GM/DL — LOW (ref 32–36)
MCHC RBC-ENTMCNC: 31.6 GM/DL — LOW (ref 32–36)
MCV RBC AUTO: 84.8 FL — SIGNIFICANT CHANGE UP (ref 80–100)
MCV RBC AUTO: 84.8 FL — SIGNIFICANT CHANGE UP (ref 80–100)
NRBC # BLD: 0 /100 WBCS — SIGNIFICANT CHANGE UP (ref 0–0)
NRBC # BLD: 0 /100 WBCS — SIGNIFICANT CHANGE UP (ref 0–0)
PLATELET # BLD AUTO: 259 K/UL — SIGNIFICANT CHANGE UP (ref 150–400)
PLATELET # BLD AUTO: 259 K/UL — SIGNIFICANT CHANGE UP (ref 150–400)
POTASSIUM SERPL-MCNC: 4.2 MMOL/L — SIGNIFICANT CHANGE UP (ref 3.5–5.3)
POTASSIUM SERPL-MCNC: 4.2 MMOL/L — SIGNIFICANT CHANGE UP (ref 3.5–5.3)
POTASSIUM SERPL-SCNC: 4.2 MMOL/L — SIGNIFICANT CHANGE UP (ref 3.5–5.3)
POTASSIUM SERPL-SCNC: 4.2 MMOL/L — SIGNIFICANT CHANGE UP (ref 3.5–5.3)
RBC # BLD: 5.12 M/UL — SIGNIFICANT CHANGE UP (ref 3.8–5.2)
RBC # BLD: 5.12 M/UL — SIGNIFICANT CHANGE UP (ref 3.8–5.2)
RBC # FLD: 13.2 % — SIGNIFICANT CHANGE UP (ref 10.3–14.5)
RBC # FLD: 13.2 % — SIGNIFICANT CHANGE UP (ref 10.3–14.5)
SODIUM SERPL-SCNC: 137 MMOL/L — SIGNIFICANT CHANGE UP (ref 135–145)
SODIUM SERPL-SCNC: 137 MMOL/L — SIGNIFICANT CHANGE UP (ref 135–145)
WBC # BLD: 9.43 K/UL — SIGNIFICANT CHANGE UP (ref 3.8–10.5)
WBC # BLD: 9.43 K/UL — SIGNIFICANT CHANGE UP (ref 3.8–10.5)
WBC # FLD AUTO: 9.43 K/UL — SIGNIFICANT CHANGE UP (ref 3.8–10.5)
WBC # FLD AUTO: 9.43 K/UL — SIGNIFICANT CHANGE UP (ref 3.8–10.5)

## 2023-12-26 PROCEDURE — 93010 ELECTROCARDIOGRAM REPORT: CPT | Mod: NC

## 2023-12-26 RX ORDER — SODIUM CHLORIDE 9 MG/ML
1000 INJECTION, SOLUTION INTRAVENOUS
Refills: 0 | Status: DISCONTINUED | OUTPATIENT
Start: 2024-01-09 | End: 2024-01-23

## 2023-12-26 NOTE — H&P PST ADULT - HISTORY OF PRESENT ILLNESS
This is a 62 y/o female with PMHX of GARRETT, hypothyroid who presents to PST with pre-operative diagnosis of deviated septum  Pt reports s/o nasal obstruction making it difficulty to use CPAP.  Otherwise pt feels well today and denies any acute symptoms.

## 2023-12-26 NOTE — H&P PST ADULT - NSICDXPASTSURGICALHX_GEN_ALL_CORE_FT
PAST SURGICAL HISTORY:  H/O ovarian cystectomy     H/O:  section     History of ectopic pregnancy     S/P breast lumpectomy

## 2023-12-26 NOTE — H&P PST ADULT - NSICDXPASTMEDICALHX_GEN_ALL_CORE_FT
PAST MEDICAL HISTORY:  Deviated septum     Family history of breast cancer in first degree relative     Hypothyroidism, unspecified type     Palpitations when she was started on raloxifene years ago

## 2023-12-26 NOTE — H&P PST ADULT - EYES
[place] PRESCRIPTION DRUG MONITORING PROGRAM:  Reviewed and is compliant with drug, quantity, prescriber, dispenser, and recipient history  Start date 4/9/20   PERRL

## 2023-12-27 ENCOUNTER — APPOINTMENT (OUTPATIENT)
Dept: CARDIOLOGY | Facility: CLINIC | Age: 63
End: 2023-12-27
Payer: COMMERCIAL

## 2023-12-27 VITALS
OXYGEN SATURATION: 97 % | WEIGHT: 198 LBS | DIASTOLIC BLOOD PRESSURE: 90 MMHG | HEART RATE: 93 BPM | BODY MASS INDEX: 31.96 KG/M2 | SYSTOLIC BLOOD PRESSURE: 148 MMHG

## 2023-12-27 PROBLEM — J34.2 DEVIATED NASAL SEPTUM: Chronic | Status: ACTIVE | Noted: 2023-12-26

## 2023-12-27 PROCEDURE — 99215 OFFICE O/P EST HI 40 MIN: CPT

## 2023-12-29 PROCEDURE — 85027 COMPLETE CBC AUTOMATED: CPT

## 2023-12-29 PROCEDURE — 80048 BASIC METABOLIC PNL TOTAL CA: CPT

## 2023-12-29 PROCEDURE — G0463: CPT

## 2023-12-29 PROCEDURE — 93005 ELECTROCARDIOGRAM TRACING: CPT

## 2023-12-29 PROCEDURE — 36415 COLL VENOUS BLD VENIPUNCTURE: CPT

## 2024-01-04 ENCOUNTER — TRANSCRIPTION ENCOUNTER (OUTPATIENT)
Age: 64
End: 2024-01-04

## 2024-01-06 ENCOUNTER — NON-APPOINTMENT (OUTPATIENT)
Age: 64
End: 2024-01-06

## 2024-01-08 ENCOUNTER — TRANSCRIPTION ENCOUNTER (OUTPATIENT)
Age: 64
End: 2024-01-08

## 2024-01-09 ENCOUNTER — OUTPATIENT (OUTPATIENT)
Dept: OUTPATIENT SERVICES | Facility: HOSPITAL | Age: 64
LOS: 1 days | End: 2024-01-09
Payer: COMMERCIAL

## 2024-01-09 ENCOUNTER — APPOINTMENT (OUTPATIENT)
Dept: OTOLARYNGOLOGY | Facility: HOSPITAL | Age: 64
End: 2024-01-09
Payer: COMMERCIAL

## 2024-01-09 ENCOUNTER — RESULT REVIEW (OUTPATIENT)
Age: 64
End: 2024-01-09

## 2024-01-09 VITALS
WEIGHT: 198.42 LBS | HEIGHT: 65 IN | HEART RATE: 78 BPM | RESPIRATION RATE: 14 BRPM | OXYGEN SATURATION: 98 % | TEMPERATURE: 98 F | DIASTOLIC BLOOD PRESSURE: 81 MMHG | SYSTOLIC BLOOD PRESSURE: 134 MMHG

## 2024-01-09 DIAGNOSIS — J34.2 DEVIATED NASAL SEPTUM: ICD-10-CM

## 2024-01-09 DIAGNOSIS — Z98.890 OTHER SPECIFIED POSTPROCEDURAL STATES: Chronic | ICD-10-CM

## 2024-01-09 DIAGNOSIS — Z98.891 HISTORY OF UTERINE SCAR FROM PREVIOUS SURGERY: Chronic | ICD-10-CM

## 2024-01-09 DIAGNOSIS — G47.33 OBSTRUCTIVE SLEEP APNEA (ADULT) (PEDIATRIC): ICD-10-CM

## 2024-01-09 DIAGNOSIS — Z87.59 PERSONAL HISTORY OF OTHER COMPLICATIONS OF PREGNANCY, CHILDBIRTH AND THE PUERPERIUM: Chronic | ICD-10-CM

## 2024-01-09 PROCEDURE — 30130 EXCISE INFERIOR TURBINATE: CPT | Mod: 50

## 2024-01-09 PROCEDURE — 42975 DISE EVAL SLP DO BRTH FLX DX: CPT

## 2024-01-09 PROCEDURE — ZZZZZ: CPT

## 2024-01-09 PROCEDURE — 88300 SURGICAL PATH GROSS: CPT | Mod: 26

## 2024-01-09 PROCEDURE — 30520 REPAIR OF NASAL SEPTUM: CPT

## 2024-01-09 RX ORDER — CEPHALEXIN 500 MG
500 CAPSULE ORAL EVERY 12 HOURS
Refills: 0 | Status: COMPLETED | OUTPATIENT
Start: 2024-01-09 | End: 2024-01-10

## 2024-01-09 RX ORDER — HYDROMORPHONE HYDROCHLORIDE 2 MG/ML
1 INJECTION INTRAMUSCULAR; INTRAVENOUS; SUBCUTANEOUS
Refills: 0 | Status: DISCONTINUED | OUTPATIENT
Start: 2024-01-09 | End: 2024-01-09

## 2024-01-09 RX ORDER — ONDANSETRON 8 MG/1
4 TABLET, FILM COATED ORAL EVERY 6 HOURS
Refills: 0 | Status: DISCONTINUED | OUTPATIENT
Start: 2024-01-09 | End: 2024-01-23

## 2024-01-09 RX ORDER — OXYCODONE AND ACETAMINOPHEN 5; 325 MG/1; MG/1
1 TABLET ORAL EVERY 4 HOURS
Refills: 0 | Status: DISCONTINUED | OUTPATIENT
Start: 2024-01-09 | End: 2024-01-09

## 2024-01-09 RX ORDER — OXYCODONE AND ACETAMINOPHEN 5; 325 MG/1; MG/1
2 TABLET ORAL EVERY 6 HOURS
Refills: 0 | Status: DISCONTINUED | OUTPATIENT
Start: 2024-01-09 | End: 2024-01-09

## 2024-01-09 RX ORDER — HYDROMORPHONE HYDROCHLORIDE 2 MG/ML
0.5 INJECTION INTRAMUSCULAR; INTRAVENOUS; SUBCUTANEOUS
Refills: 0 | Status: DISCONTINUED | OUTPATIENT
Start: 2024-01-09 | End: 2024-01-09

## 2024-01-09 RX ORDER — CEPHALEXIN 500 MG
1 CAPSULE ORAL
Qty: 20 | Refills: 0
Start: 2024-01-09 | End: 2024-01-18

## 2024-01-09 RX ORDER — SODIUM CHLORIDE 9 MG/ML
1000 INJECTION, SOLUTION INTRAVENOUS
Refills: 0 | Status: DISCONTINUED | OUTPATIENT
Start: 2024-01-09 | End: 2024-01-09

## 2024-01-09 RX ORDER — ONDANSETRON 8 MG/1
4 TABLET, FILM COATED ORAL ONCE
Refills: 0 | Status: DISCONTINUED | OUTPATIENT
Start: 2024-01-09 | End: 2024-01-09

## 2024-01-09 RX ORDER — OXYCODONE AND ACETAMINOPHEN 5; 325 MG/1; MG/1
1 TABLET ORAL
Qty: 20 | Refills: 0
Start: 2024-01-09 | End: 2024-01-13

## 2024-01-09 RX ORDER — SODIUM CHLORIDE 9 MG/ML
1000 INJECTION, SOLUTION INTRAVENOUS
Refills: 0 | Status: DISCONTINUED | OUTPATIENT
Start: 2024-01-09 | End: 2024-01-23

## 2024-01-09 RX ORDER — LEVOTHYROXINE SODIUM 125 MCG
150 TABLET ORAL DAILY
Refills: 0 | Status: DISCONTINUED | OUTPATIENT
Start: 2024-01-09 | End: 2024-01-10

## 2024-01-09 RX ORDER — INFLUENZA VIRUS VACCINE 15; 15; 15; 15 UG/.5ML; UG/.5ML; UG/.5ML; UG/.5ML
0.7 SUSPENSION INTRAMUSCULAR ONCE
Refills: 0 | Status: DISCONTINUED | OUTPATIENT
Start: 2024-01-09 | End: 2024-01-23

## 2024-01-09 RX ADMIN — HYDROMORPHONE HYDROCHLORIDE 0.5 MILLIGRAM(S): 2 INJECTION INTRAMUSCULAR; INTRAVENOUS; SUBCUTANEOUS at 15:30

## 2024-01-09 RX ADMIN — HYDROMORPHONE HYDROCHLORIDE 0.5 MILLIGRAM(S): 2 INJECTION INTRAMUSCULAR; INTRAVENOUS; SUBCUTANEOUS at 14:00

## 2024-01-09 RX ADMIN — Medication 500 MILLIGRAM(S): at 21:41

## 2024-01-09 RX ADMIN — ONDANSETRON 4 MILLIGRAM(S): 8 TABLET, FILM COATED ORAL at 18:11

## 2024-01-09 RX ADMIN — SODIUM CHLORIDE 50 MILLILITER(S): 9 INJECTION, SOLUTION INTRAVENOUS at 09:07

## 2024-01-09 RX ADMIN — HYDROMORPHONE HYDROCHLORIDE 0.5 MILLIGRAM(S): 2 INJECTION INTRAMUSCULAR; INTRAVENOUS; SUBCUTANEOUS at 16:00

## 2024-01-09 RX ADMIN — SODIUM CHLORIDE 75 MILLILITER(S): 9 INJECTION, SOLUTION INTRAVENOUS at 18:11

## 2024-01-09 NOTE — PROGRESS NOTE ADULT - SUBJECTIVE AND OBJECTIVE BOX
Patient is a 63y old  Female who presents with a chief complaint of     BRIEF HOSPITAL COURSE:   63F PMHx GARRETT, hypothyroid who presents to PST with pre-operative diagnosis of deviated septum  Pt reports s/o nasal obstruction making it difficulty to use CPAP.     Events last 24 hours:   -S/p Septoplasty/Turbinate ELIDIA POD#0.   -Pain controlled. Satting well on face vent.     PAST MEDICAL & SURGICAL HISTORY:  Family history of breast cancer in first degree relative  Hypothyroidism, unspecified type  Palpitations  when she was started on raloxifene years ago  Deviated septum  H/O:  section    H/O ovarian cystectomy    History of ectopic pregnancy    S/P breast lumpectomy    Review of Systems:  CONSTITUTIONAL: No fever, chills, or fatigue  EYES: No eye pain, visual disturbances, or discharge  ENMT:  No difficulty hearing, tinnitus, vertigo; No sinus or throat pain  NECK: No pain or stiffness  RESPIRATORY: No cough, wheezing, chills or hemoptysis; No shortness of breath  CARDIOVASCULAR: No chest pain, palpitations, dizziness, or leg swelling  GASTROINTESTINAL: No abdominal or epigastric pain. No nausea, vomiting, or hematemesis; No diarrhea or constipation. No melena or hematochezia.  GENITOURINARY: No dysuria, frequency, hematuria, or incontinence  NEUROLOGICAL: No headaches, memory loss, loss of strength, numbness, or tremors  SKIN: No itching, burning, rashes, or lesions   MUSCULOSKELETAL: No joint pain or swelling; No muscle, back, or extremity pain  PSYCHIATRIC: No depression, anxiety, mood swings, or difficulty sleeping    Medications:  cephalexin 500 milliGRAM(s) Oral every 12 hours  ondansetron Injectable 4 milliGRAM(s) IV Push every 6 hours PRN  oxycodone    5 mG/acetaminophen 325 mG 1 Tablet(s) Oral every 4 hours PRN  oxycodone    5 mG/acetaminophen 325 mG 2 Tablet(s) Oral every 6 hours PRN  levothyroxine 175 MICROGram(s) Oral daily  lactated ringers. 1000 milliLiter(s) IV Continuous <Continuous>  lactated ringers. 1000 milliLiter(s) IV Continuous <Continuous>  influenza  Vaccine (HIGH DOSE) 0.7 milliLiter(s) IntraMuscular once    ICU Vital Signs Last 24 Hrs  T(C): 36.4 (2024 17:59), Max: 37 (2024 13:34)  T(F): 97.6 (2024 17:59), Max: 98.6 (2024 13:34)  HR: 94 (2024 17:59) (78 - 100)  BP: 128/80 (2024 17:59) (128/80 - 150/80)  BP(mean): --  ABP: --  ABP(mean): --  RR: 15 (2024 17:59) (14 - 16)  SpO2: 97% (2024 17:59) (93% - 99%)  O2 Parameters below as of 2024 13:34  Patient On (Oxygen Delivery Method): face tent    I&O's Detail  2024 07:01  -  2024 20:19  --------------------------------------------------------  IN:    Lactated Ringers: 225 mL  Total IN: 225 mL  OUT:  Total OUT: 0 mL  Total NET: 225 mL    LABS:    CAPILLARY BLOOD GLUCOSE    CULTURES:    Physical Examination:  General: Alert, oriented, interactive, nonfocal.  HEENT: PERRL.  NECK: Supple.   PULM: Clear to auscultation bilaterally.  CVS: s1/s2.  ABD: Soft, nondistended, nontender, normoactive bowel sounds.  EXT: No edema, nontender.  SKIN: Warm.    RADIOLOGY:     Time spent on this patient encounter, which includes documenting this note in the electronic medical record, was +55 minutes including assessing the presenting problems with associated risks, reviewing the medical record to prepare for the encounter, and meeting face to face with the patient to obtain additional history. I have also performed an appropriate physical exam, made interventions listed and ordered and interpreted appropriate diagnostic studies as documented. To improve communication and patient safety, I have coordinated care with the multidisciplinary team including the bedside nurse, appropriate attending of record and consultants as needed. This time is independent of any procedures performed.    Date of entry of this note is equal to the date of services rendered.

## 2024-01-09 NOTE — BRIEF OPERATIVE NOTE - NSICDXBRIEFPREOP_GEN_ALL_CORE_FT
PRE-OP DIAGNOSIS:  GARRETT (obstructive sleep apnea) 09-Jan-2024 11:45:23  Liborio Krishna  Deviated septum 09-Jan-2024 11:45:29  Liborio Krishna

## 2024-01-09 NOTE — BRIEF OPERATIVE NOTE - NSICDXBRIEFPROCEDURE_GEN_ALL_CORE_FT
PROCEDURES:  Nasal septoplasty 09-Jan-2024 11:45:02  Liborio Krishna  Drug induced sleep endoscopy 09-Jan-2024 11:45:11  Liborio Krishna

## 2024-01-09 NOTE — PROGRESS NOTE ADULT - ASSESSMENT
63F PMHx GARRETT, hypothyroid who presents to PST with pre-operative diagnosis of deviated septum  Pt reports s/o nasal obstruction making it difficulty to use CPAP.     Events last 24 hours:   -S/p Septoplasty/Turbinate ELIDIA POD#0.  63F PMHx GARRETT, hypothyroid who presents to PST with pre-operative diagnosis of deviated septum  Pt reports s/o nasal obstruction making it difficulty to use CPAP. S/p Septoplasty/Turbinate ELIDIA POD#0.     Plan:  NEURO: Optimize pain control - analgesia PRN with Percocet. Serial neurologic assessments.   CV: Maintain goal MAP >65. Keep K~4 and Mg>2 for optimal arrhythmia suppression.  PULM: Satting well on face tent, will utilize supplemental O2 PRN to maintain goal SpO2 >88%. Incentive spirometry, Chest PT/Pulmonary toilet, HOB >30'. .   GI: CLD.   RENAL: Trend lytes/Scr daily with BMP, Strict I's and O's, goal UOP 0.5 cc/kg/hr, renal dose meds and avoid nephrotoxins. Gentle mIVF with LR.   ENDO: Aggressive glycemic control to limit FS glucose to <180mg/dl. Synthroid for hx of hypothyroidism, keep euthyroid.   ID: Post-Op Abx with Keflex q12h.   HEME: VTE ppx with SCDs.     AM labs.    GOC: Full Code.

## 2024-01-09 NOTE — PATIENT PROFILE ADULT - NSPROPTRIGHTBILLOFRIGHTS_GEN_A_NUR
Chart and labs reviewed for length of stay. No nutrition intervention appears indicated at this time. Patient eating 100% most meals, on regular diet, no significant weight loss in last 1 year. RD available via consult. Will continue to follow per protocol.    patient

## 2024-01-09 NOTE — BRIEF OPERATIVE NOTE - OPERATION/FINDINGS
deviated septum, hypertrophic turbinates, anterior to posterior collapse of nasopharynx, see operative note for details

## 2024-01-09 NOTE — ASU PATIENT PROFILE, ADULT - FALL HARM RISK - UNIVERSAL INTERVENTIONS
Bed in lowest position, wheels locked, appropriate side rails in place/Call bell, personal items and telephone in reach/Instruct patient to call for assistance before getting out of bed or chair/Non-slip footwear when patient is out of bed/[place] to call system/Physically safe environment - no spills, clutter or unnecessary equipment/Purposeful Proactive Rounding/Room/bathroom lighting operational, light cord in reach Bed in lowest position, wheels locked, appropriate side rails in place/Call bell, personal items and telephone in reach/Instruct patient to call for assistance before getting out of bed or chair/Non-slip footwear when patient is out of bed/[place] to call system/Physically safe environment - no spills, clutter or unnecessary equipment/Purposeful Proactive Rounding/Room/bathroom lighting operational, light cord in reach

## 2024-01-09 NOTE — PATIENT PROFILE ADULT - FALL HARM RISK - RISK INTERVENTIONS
Assistance OOB with selected safe patient handling equipment/Assistance with ambulation/Communicate Fall Risk and Risk Factors to all staff, patient, and family/Monitor gait and stability/Reinforce activity limits and safety measures with patient and family/Sit up slowly, dangle for a short time, stand at bedside before walking/Use of alarms - bed, chair and/or voice tab/Visual Cue: Yellow wristband/Bed in lowest position, wheels locked, appropriate side rails in place/Call bell, personal items and telephone in reach/Instruct patient to call for assistance before getting out of bed or chair/Non-slip footwear when patient is out of bed/[place] to call system/Physically safe environment - no spills, clutter or unnecessary equipment/Purposeful Proactive Rounding/Room/bathroom lighting operational, light cord in reach Assistance OOB with selected safe patient handling equipment/Assistance with ambulation/Communicate Fall Risk and Risk Factors to all staff, patient, and family/Monitor gait and stability/Reinforce activity limits and safety measures with patient and family/Sit up slowly, dangle for a short time, stand at bedside before walking/Use of alarms - bed, chair and/or voice tab/Visual Cue: Yellow wristband/Bed in lowest position, wheels locked, appropriate side rails in place/Call bell, personal items and telephone in reach/Instruct patient to call for assistance before getting out of bed or chair/Non-slip footwear when patient is out of bed/[place] to call system/Physically safe environment - no spills, clutter or unnecessary equipment/Purposeful Proactive Rounding/Room/bathroom lighting operational, light cord in reach

## 2024-01-10 ENCOUNTER — TRANSCRIPTION ENCOUNTER (OUTPATIENT)
Age: 64
End: 2024-01-10

## 2024-01-10 VITALS
SYSTOLIC BLOOD PRESSURE: 118 MMHG | RESPIRATION RATE: 26 BRPM | DIASTOLIC BLOOD PRESSURE: 65 MMHG | HEART RATE: 104 BPM | OXYGEN SATURATION: 92 % | TEMPERATURE: 98 F

## 2024-01-10 PROCEDURE — 30130 EXCISE INFERIOR TURBINATE: CPT | Mod: 50

## 2024-01-10 PROCEDURE — 30520 REPAIR OF NASAL SEPTUM: CPT

## 2024-01-10 PROCEDURE — C9399: CPT

## 2024-01-10 PROCEDURE — 88300 SURGICAL PATH GROSS: CPT

## 2024-01-10 PROCEDURE — 42975 DISE EVAL SLP DO BRTH FLX DX: CPT

## 2024-01-10 RX ORDER — LEVOTHYROXINE SODIUM 125 MCG
175 TABLET ORAL DAILY
Refills: 0 | Status: DISCONTINUED | OUTPATIENT
Start: 2024-01-10 | End: 2024-01-23

## 2024-01-10 RX ORDER — LEVOTHYROXINE SODIUM 125 MCG
1 TABLET ORAL
Qty: 0 | Refills: 0 | DISCHARGE
Start: 2024-01-10

## 2024-01-10 RX ADMIN — ONDANSETRON 4 MILLIGRAM(S): 8 TABLET, FILM COATED ORAL at 10:04

## 2024-01-10 RX ADMIN — SODIUM CHLORIDE 75 MILLILITER(S): 9 INJECTION, SOLUTION INTRAVENOUS at 05:34

## 2024-01-10 RX ADMIN — Medication 500 MILLIGRAM(S): at 05:36

## 2024-01-10 RX ADMIN — Medication 175 MICROGRAM(S): at 10:00

## 2024-01-10 NOTE — DISCHARGE NOTE NURSING/CASE MANAGEMENT/SOCIAL WORK - NSDCPEFALRISK_GEN_ALL_CORE
For information on Fall & Injury Prevention, visit: [email or web address]-[email or web address]-[email or web address]-mobility OR  https://[email or web address].[facility]/news/fall-prevention-tips-[email or web address]-injury OR  https://www.cdc.gov/steadi/patient.html For information on Fall & Injury Prevention, visit: [email or web address]://[email or web address].[email or web address]/news/fall-[email or web address]-maintains-health-[email or web address]-mobility OR  https://[email or web address].[email or web address]/news/fall-[email or web address]-tips-to-avoid-injury OR  https://www.cdc.gov/steadi/patient.html

## 2024-01-10 NOTE — ASU DISCHARGE PLAN (ADULT/PEDIATRIC) - FOLLOW UP APPOINTMENTS
12/5/22 2ND attempt LM at [phone] informing pt to call back to schedule with VSP for 12/29/22 at 170w.ok'd per pm linda [phone]

## 2024-01-10 NOTE — PROGRESS NOTE ADULT - SUBJECTIVE AND OBJECTIVE BOX
ENT SURGERY PROGRESS NOTE  Pt. seen and examined at bedside resting comfortably. Pt had 1 episode of vomiting overnight, states due to taste of blood running down from nose. Pt nausea improved this AM. States pain controlled. Tolerating diet well.   Denies fever/chills, chest pain, dyspnea, cough, dizziness.     Vital Signs Last 24 Hrs  T(C): 36.4 (10 Tanmay 2024 05:57), Max: 37 (09 Jan 2024 13:34)  T(F): 97.6 (10 Tanmay 2024 05:57), Max: 98.6 (09 Jan 2024 13:34)  HR: 103 (10 Tanmay 2024 05:57) (87 - 103)  BP: 129/63 (10 Tanmay 2024 05:57) (121/70 - 150/80)  BP(mean): --  RR: 16 (10 Tanmay 2024 05:57) (15 - 16)  SpO2: 98% (10 Tanmay 2024 05:57) (93% - 99%)    Parameters below as of 09 Jan 2024 13:34  Patient On (Oxygen Delivery Method): face tent      PHYSICAL EXAM:    GENERAL: NAD  HEAD:  Atraumatic, Normocephalic  EYES: EOMI, PERRLA, conjunctiva and sclera clear  ENT: Mild nasal swelling, no ecchymosis noted, nasal packing removed, no active bleeding noted, some old clots in nasal passage cleaned off  ABDOMEN: soft, ND, NT  EXTREMITIES:  calf soft, non tender b/l    I&O's Detail    09 Jan 2024 07:01  -  10 Tanmay 2024 07:00  --------------------------------------------------------  IN:    Lactated Ringers: 225 mL  Total IN: 225 mL    OUT:  Total OUT: 0 mL    Total NET: 225 mL          Impression: 63y Female with PMHX of GARRETT, hypothyroid now POD1 s/p septoplasty/turbinectomy w/ DISE. Pt admitted overnight for monitoring d/t GARRETT hx. Pt recovering well, nausea improved w/ zofran. Packing removed. Tolerating diet. HD stable.     Plan:  - DC home today  - RX sent to pharmacy  - Follow up in 1 week w/ Dr. Flores

## 2024-01-10 NOTE — DISCHARGE NOTE NURSING/CASE MANAGEMENT/SOCIAL WORK - PATIENT PORTAL LINK FT
You can access the FollowMyHealth Patient Portal offered by [facility] by registering at the following website: [email or web address]. By joining [employer]’s FollowMyHealth portal, you will also be able to view your health information using other applications (apps) compatible with our system. You can access the FollowMyHealth Patient Portal offered by [facility] by registering at the following website: [email or web address]. By joining [employer]’s FollowMyHealth portal, you will also be able to view your health information using other applications (apps) compatible with our system.

## 2024-01-10 NOTE — ASU DISCHARGE PLAN (ADULT/PEDIATRIC) - ASU DC SPECIAL INSTRUCTIONSFT
Follow instructions provided on printed document by surgical team    Prescriptions for pain and antibiotic sent to pharmacy. May take Tylenol over the counter as needed for pain as well, but do not take more than 4000mg per day. Percocet medication contains Tylenol as well.    Follow up in 1 week with Dr. Flores    Some bloody drainage mixed with mucus from nose is expected, but if becomes consistent or severe call office or return to hospital.

## 2024-01-10 NOTE — ASU DISCHARGE PLAN (ADULT/PEDIATRIC) - CARE PROVIDER_API CALL
Coy Flores  Otolaryngology  [address] [place] [id number]  Phone: [phone]  Fax: [phone]  Follow Up Time: 1 week   Coy Flores  Otolaryngology  [address] [place] [id number]  Phone: [phone]  Fax: [phone]  Follow Up Time: 1 week   patient/family

## 2024-01-10 NOTE — ASU DISCHARGE PLAN (ADULT/PEDIATRIC) - NS MD DC FALL RISK RISK
For information on Fall & Injury Prevention, visit: [email or web address].[facility]/[email or web address]-prevention-protects-and-maintains-health-and-mobility OR  https://www.[facility]/news/fall-prevention-tips-to-avoid-injury OR  https://www.cdc.gov/steadi/patient.html For information on Fall & Injury Prevention, visit: [email or web address]://www.[facility]/news/fall-prevention-protects-and-maintains-health-and-mobility OR  https://www.[facility]/news/fall-prevention-tips-to-avoid-injury OR  https://www.cdc.gov/steadi/patient.html

## 2024-01-10 NOTE — ASU DISCHARGE PLAN (ADULT/PEDIATRIC) - PHYSICIAN SECTION COMPLETE
Pending Prescriptions:                       Disp   Refills    metFORMIN (GLUCOPHAGE-XR) 500 MG 24 hr tab*180 ta*1        Sig: TAKE 2 TABLETS (1,000 MG) BY MOUTH ONCE DAILY (WITH           DINNER)    Routing refill request to provider for review/approval because:  Labs not current:  A1c  Lab Results   Component Value Date    A1C 7.1 12/04/2020    A1C 7.4 01/24/2020    A1C 7.7 10/24/2019    A1C 7.1 05/03/2019    A1C 7.6 02/21/2019     Patient needs to be seen because:  due for 6 month f/u         Yes

## 2024-01-11 LAB
SURGICAL PATHOLOGY STUDY: SIGNIFICANT CHANGE UP
SURGICAL PATHOLOGY STUDY: SIGNIFICANT CHANGE UP

## 2024-01-17 ENCOUNTER — APPOINTMENT (OUTPATIENT)
Dept: OTOLARYNGOLOGY | Facility: CLINIC | Age: 64
End: 2024-01-17
Payer: COMMERCIAL

## 2024-01-17 PROCEDURE — 99024 POSTOP FOLLOW-UP VISIT: CPT

## 2024-01-23 NOTE — PROCEDURE
[FreeTextEntry3] : Nose packed with Lidocaine and Afrin soaked cotton. Nose suctioned and crusts debrided. Nasal splint and sutures removed. Pt can breathe well.

## 2024-01-23 NOTE — HISTORY OF PRESENT ILLNESS
[de-identified] : Pt is healing well. Pt admits to using saline diligently throughout the week. Pt has moderate nasal congestion and mild pain.

## 2024-01-24 ENCOUNTER — APPOINTMENT (OUTPATIENT)
Dept: OTOLARYNGOLOGY | Facility: CLINIC | Age: 64
End: 2024-01-24
Payer: COMMERCIAL

## 2024-01-24 DIAGNOSIS — J31.0 CHRONIC RHINITIS: ICD-10-CM

## 2024-01-24 PROCEDURE — 99024 POSTOP FOLLOW-UP VISIT: CPT

## 2024-01-24 RX ORDER — MUPIROCIN 20 MG/G
2 OINTMENT TOPICAL
Qty: 1 | Refills: 1 | Status: ACTIVE | COMMUNITY
Start: 2024-01-24 | End: 1900-01-01

## 2024-03-04 ENCOUNTER — RX RENEWAL (OUTPATIENT)
Age: 64
End: 2024-03-04

## 2024-03-04 RX ORDER — ESTRADIOL 10 UG/1
10 TABLET, FILM COATED VAGINAL
Qty: 24 | Refills: 3 | Status: ACTIVE | COMMUNITY
Start: 2021-09-13 | End: 1900-01-01

## 2024-03-06 ENCOUNTER — NON-APPOINTMENT (OUTPATIENT)
Age: 64
End: 2024-03-06

## 2024-03-07 ENCOUNTER — TRANSCRIPTION ENCOUNTER (OUTPATIENT)
Age: 64
End: 2024-03-07

## 2024-03-11 ENCOUNTER — TRANSCRIPTION ENCOUNTER (OUTPATIENT)
Age: 64
End: 2024-03-11

## 2024-04-10 ENCOUNTER — APPOINTMENT (OUTPATIENT)
Dept: SURGICAL ONCOLOGY | Facility: CLINIC | Age: 64
End: 2024-04-10
Payer: COMMERCIAL

## 2024-04-10 VITALS
HEART RATE: 107 BPM | HEIGHT: 66 IN | OXYGEN SATURATION: 97 % | SYSTOLIC BLOOD PRESSURE: 152 MMHG | RESPIRATION RATE: 17 BRPM | DIASTOLIC BLOOD PRESSURE: 86 MMHG

## 2024-04-10 DIAGNOSIS — N60.99 UNSPECIFIED BENIGN MAMMARY DYSPLASIA OF UNSPECIFIED BREAST: ICD-10-CM

## 2024-04-10 PROCEDURE — 99214 OFFICE O/P EST MOD 30 MIN: CPT

## 2024-04-10 NOTE — ASSESSMENT
[FreeTextEntry1] : The patient is a 62 year old female with history of R breast excisional biopsy showing ALH, here for a follow up visit.  Recent imaging revealed mildly prominent left intramammary LNs, otherwise unremarkable; BR2.  Exam today stable- no masses or skin changes  We discussed her Inspire inplant for sleep apnea--depending on the model, she may or may not be able to undergo MRIs which are recommended for high risk breast cancer screening. If she is unable to undergo MRI screening, we can consider contrast-enhanced mammography. Pt is aware and agreeable. She will reach out once she knows which model of Inspire will be implanted.   Plan:  - Next mmg/US 9/2024  - Next MRI 1/2025 (or contrast mammogram)  - RTO 1 year

## 2024-04-10 NOTE — PHYSICAL EXAM
[Normocephalic] : normocephalic [Atraumatic] : atraumatic [EOMI] : extra ocular movement intact [PERRL] : pupils equal, round and reactive to light [Sclera nonicteric] : sclera nonicteric [Supple] : supple [No Supraclavicular Adenopathy] : no supraclavicular adenopathy [No Thyromegaly] : no thyromegaly [Examined in the supine and seated position] : examined in the supine and seated position [Bra Size: ___] : Bra Size: [unfilled] [Grade 1] : Ptosis Grade 1 [No dominant masses] : no dominant masses in right breast  [No dominant masses] : no dominant masses left breast [No Nipple Retraction] : no left nipple retraction [No Nipple Discharge] : no left nipple discharge [Breast Mass Right Breast ___cm] : no masses [Breast Mass Left Breast ___cm] : no masses [No Axillary Lymphadenopathy] : no left axillary lymphadenopathy [No Edema] : no edema [No Rashes] : no rashes [No Ulceration] : no ulceration [Breast Nipple Inversion] : nipples not inverted [Breast Nipple Retraction] : nipples not retracted [Breast Nipple Flattening] : nipples not flattened [Breast Nipple Fissures] : nipples not fissured [Breast Abnormal Lactation (Galactorrhea)] : no galactorrhea [Breast Abnormal Secretion Bloody Fluid] : no bloody discharge [Breast Abnormal Secretion Serous Fluid] : no serous discharge [Breast Abnormal Secretion Opalescent Fluid] : no milky discharge [de-identified] : Left posterior cervical mass, stable - per pt, has been present for 40 years [de-identified] : non-labored respirations  [de-identified] : well-healed superior circumareolar incision [de-identified] : well-healed inferior circumareolar incision

## 2024-04-10 NOTE — HISTORY OF PRESENT ILLNESS
[FreeTextEntry1] : The patient is a 63 year old female referred for consultation by Dr. Sury Fuentes for high risk evaluation here for a follow up visit.  Prior History: The patient reports having history of a R breast excisional biopsy done in  showing ALH. She has also had a Left breast excisional biopsy in 2016 that was benign. For the atypia, the patient was put on tamoxifen, which she had significant side effects with (hot flashes, mood swings), and she was not very compliant. This was switched to raloxifene after menopause in . She completed 5 years of treatment, and stopped in .  Most recent imagin2021 MRI  - Neg, BR2  2021 B/L SM (NW), TC 24.6%, heterogeneously dense breasts - B/L postoperative changes - R anterior central outer questionable calcifications lateral to clip->DM - L negative  2021 B/L US - R scattered cysts up to 3 mm - L 3:00 N12 nonspecific lymph node up to 6 mm - BR0  2021 R DM - R breast postsurgical changes -Could not replicate calcifications, consistent with artifact - BR2  During initial visit, the patient reports no breast complaints. Denies pain, masses, skin changes, or nipple discharge.   She has history of hypothyroidism for which she takes Synthroid.  She has had prior , bilateral breast excisional biopsies, carpal tunnel syndrome surgery, uterine ablation, dermoid cyst excision.  She reports family history of breast cancer in her mother age 63 for which she underwent a lumpectomy followed by tamoxifen for 7 years.  Cancer recurred when the patient was in her late 60s and the patient underwent a mastectomy.  She is still alive and well, with dementia.  Maternal cousin twice removed, with breast cancer.  8/10/2022 B/L SM (NW) TC 18.6% - R central bx marker - B/L scattered calcs and nodular fibroglandular breast pattern are stable  8/10/2022 B/L US - R 12:00 N5: 3 x 2 x 3 mm cyst, stable - L 3:00 N12: subcm intramammary LN w/o suspicious features  - BR2  2023 MRI (NW) - R upper central to slightly inner: bx clip - L stable postsurgical changes - L previously reported mildly prominent LNs are w/o significant change-> f/u clinically - R hepatic lobe: subcm T2 hyperintense lesion again noted presume to represent hepatic cyst - BR2  Interval History:  Left foot fracture after fall at home, doing better now. No breast complaints today--no masses, skin changes or nipple discharge.  23 B/L SM (NW) TC 19.7% - SFGD - R upper central biopsy marker  - B/L scattered calcifications are stable  - B/L nodular fibroglandular pattern stable   23 B/L US (NW)  - R 12:00 N4 is a 4mm cyst vs benign nodule unchanged - L 3:00 N12 is a 8mm benign-appearing intrammary lymph node, unchanged.  - BR 2   Interval history:  Patient denies any breast complaints today. Denies any breast masses, skin changes, or nipple discharge. Pt is scheduled for Inspire implant (for sleep apnea) .

## 2024-04-10 NOTE — CONSULT LETTER
[FreeTextEntry3] : Mikaela Potter MD\par  Breast Surgeon\par  Division of Surgical Oncology\par  Department of Surgery\par  [address] [place] [address]\[place] [address] \par  Tel: [phone]\par  Fax: [phone]\par  Email: [email or web address]

## 2024-04-12 ENCOUNTER — APPOINTMENT (OUTPATIENT)
Dept: CARDIOLOGY | Facility: CLINIC | Age: 64
End: 2024-04-12

## 2024-04-18 PROBLEM — R00.2 PALPITATIONS: Status: ACTIVE | Noted: 2018-10-31

## 2024-04-19 ENCOUNTER — APPOINTMENT (OUTPATIENT)
Dept: CARDIOLOGY | Facility: CLINIC | Age: 64
End: 2024-04-19
Payer: COMMERCIAL

## 2024-04-19 ENCOUNTER — NON-APPOINTMENT (OUTPATIENT)
Age: 64
End: 2024-04-19

## 2024-04-19 VITALS
BODY MASS INDEX: 32.44 KG/M2 | HEART RATE: 87 BPM | WEIGHT: 201 LBS | SYSTOLIC BLOOD PRESSURE: 183 MMHG | OXYGEN SATURATION: 97 % | DIASTOLIC BLOOD PRESSURE: 90 MMHG

## 2024-04-19 DIAGNOSIS — R00.2 PALPITATIONS: ICD-10-CM

## 2024-04-19 PROCEDURE — G2211 COMPLEX E/M VISIT ADD ON: CPT

## 2024-04-19 PROCEDURE — 99214 OFFICE O/P EST MOD 30 MIN: CPT

## 2024-04-19 PROCEDURE — 99402 PREV MED CNSL INDIV APPRX 30: CPT | Mod: 25

## 2024-04-19 PROCEDURE — 93306 TTE W/DOPPLER COMPLETE: CPT

## 2024-04-19 PROCEDURE — 93000 ELECTROCARDIOGRAM COMPLETE: CPT

## 2024-04-22 ENCOUNTER — NON-APPOINTMENT (OUTPATIENT)
Age: 64
End: 2024-04-22

## 2024-04-22 ENCOUNTER — APPOINTMENT (OUTPATIENT)
Dept: INTERNAL MEDICINE | Facility: CLINIC | Age: 64
End: 2024-04-22

## 2024-04-22 ENCOUNTER — OUTPATIENT (OUTPATIENT)
Dept: OUTPATIENT SERVICES | Facility: HOSPITAL | Age: 64
LOS: 1 days | End: 2024-04-22

## 2024-04-22 ENCOUNTER — OUTPATIENT (OUTPATIENT)
Dept: OUTPATIENT SERVICES | Facility: HOSPITAL | Age: 64
LOS: 1 days | End: 2024-04-22
Payer: COMMERCIAL

## 2024-04-22 ENCOUNTER — TRANSCRIPTION ENCOUNTER (OUTPATIENT)
Age: 64
End: 2024-04-22

## 2024-04-22 VITALS
HEIGHT: 66 IN | OXYGEN SATURATION: 97 % | DIASTOLIC BLOOD PRESSURE: 76 MMHG | SYSTOLIC BLOOD PRESSURE: 126 MMHG | RESPIRATION RATE: 18 BRPM | TEMPERATURE: 98 F | WEIGHT: 199.3 LBS | HEART RATE: 93 BPM

## 2024-04-22 DIAGNOSIS — Z98.890 OTHER SPECIFIED POSTPROCEDURAL STATES: Chronic | ICD-10-CM

## 2024-04-22 DIAGNOSIS — G47.33 OBSTRUCTIVE SLEEP APNEA (ADULT) (PEDIATRIC): ICD-10-CM

## 2024-04-22 DIAGNOSIS — J34.2 DEVIATED NASAL SEPTUM: Chronic | ICD-10-CM

## 2024-04-22 DIAGNOSIS — Z98.891 HISTORY OF UTERINE SCAR FROM PREVIOUS SURGERY: Chronic | ICD-10-CM

## 2024-04-22 DIAGNOSIS — Z01.818 ENCOUNTER FOR OTHER PREPROCEDURAL EXAMINATION: ICD-10-CM

## 2024-04-22 DIAGNOSIS — Z87.59 PERSONAL HISTORY OF OTHER COMPLICATIONS OF PREGNANCY, CHILDBIRTH AND THE PUERPERIUM: Chronic | ICD-10-CM

## 2024-04-22 DIAGNOSIS — E03.9 HYPOTHYROIDISM, UNSPECIFIED: ICD-10-CM

## 2024-04-22 PROCEDURE — G0463: CPT

## 2024-04-22 PROCEDURE — 80048 BASIC METABOLIC PNL TOTAL CA: CPT

## 2024-04-22 PROCEDURE — 85610 PROTHROMBIN TIME: CPT

## 2024-04-22 PROCEDURE — 85027 COMPLETE CBC AUTOMATED: CPT

## 2024-04-22 PROCEDURE — 85730 THROMBOPLASTIN TIME PARTIAL: CPT

## 2024-04-22 PROCEDURE — 36415 COLL VENOUS BLD VENIPUNCTURE: CPT

## 2024-04-22 NOTE — H&P PST ADULT - NSICDXFAMILYHX_GEN_ALL_CORE_FT
FAMILY HISTORY:  Father  Still living? No  FH: asthma, Age at diagnosis: Age Unknown    Mother  Still living? Yes, Estimated age: Age Unknown  Family history of breast cancer, Age at diagnosis: Age Unknown  FH: HTN (hypertension), Age at diagnosis: Age Unknown

## 2024-04-22 NOTE — H&P PST ADULT - NSICDXPASTSURGICALHX_GEN_ALL_CORE_FT
PAST SURGICAL HISTORY:  DNS (deviated nasal septum)     H/O ovarian cystectomy     H/O:  section     History of ectopic pregnancy     S/P breast lumpectomy

## 2024-04-22 NOTE — H&P PST ADULT - COMMENTS
Denies h/o or family h/o DVT, PE  Denies bleeding or clotting disorders  Denies dentures/partials, loose teeth/caps 437/87 electronic

## 2024-04-22 NOTE — H&P PST ADULT - PROBLEM SELECTOR PLAN 1
Patient provided with pre-operative instructions and verbalized understanding.  Patient will be NPO on day of surgery. Patient will stop NSAIDs, aspirin, herbal supplements or vitamins 1 week prior to surgery.  Chlorhexidine wash provided with instructions. Patient provided with pre-operative instructions and verbalized understanding.  Patient can take synthroid but otherwise will be NPO on day of surgery. Patient will stop NSAIDs, aspirin, herbal supplements or vitamins 1 week prior to surgery.  Chlorhexidine wash provided with instructions, verbalized understanding.     Pending cardiac clearance as per surgeon.

## 2024-04-22 NOTE — H&P PST ADULT - SPO2 (%)
Interval History: NAEON. NSR on monitor with HR in 60s. Patient up in chair this morning. Worked with PT and upon return to room  insisted that she return to the bed. PT tried to explain that patient needs to be up in chair but he became increasingly angry and yelling that he would put her in bed himself. Charge nurse and myself tried to speak to patient but he was still upset. Agreed to let patient remain in bed for 1 hour and then return to chair. Will place surgical bra and start IV cefriaxone as inferior part of incision with small area of purulence/dehiscence.     Review of Systems   Constitution: Positive for malaise/fatigue.   Cardiovascular: Positive for dyspnea on exertion. Negative for chest pain and palpitations.   Respiratory: Negative for shortness of breath.    Hematologic/Lymphatic: Negative for bleeding problem.   Gastrointestinal: Negative for abdominal pain.   Genitourinary: Negative for dysuria.     Medications:  Continuous Infusions:   heparin (porcine)       Scheduled Meds:   sodium chloride 0.9%   Intravenous Once    amiodarone  400 mg Oral TID    aspirin  81 mg Oral Daily    atorvastatin  80 mg Oral QHS    cefTRIAXone (ROCEPHIN) IVPB  1 g Intravenous Q12H    clopidogrel  75 mg Oral Daily    escitalopram oxalate  10 mg Oral Daily    heparin (porcine)  5,000 Units Subcutaneous Q8H    metoprolol succinate  50 mg Oral Daily    pantoprazole  40 mg Oral BID    polyethylene glycol  17 g Oral Daily    scopolamine  1 patch Transdermal Q3 Days    sevelamer carbonate  800 mg Oral TID WM     PRN Meds:acetaminophen, albuterol-ipratropium, bisacodyl, Dextrose 10% Bolus, Dextrose 10% Bolus, glucagon (human recombinant), glucose, glucose, heparin (porcine), heparin (porcine), HYDROmorphone, insulin aspart U-100, metoclopramide HCl, metoprolol, ondansetron, oxyCODONE, oxyCODONE     Objective:     Vital Signs (Most Recent):  Temp: 98.2 °F (36.8 °C) (01/29/20 0719)  Pulse: 62 (01/29/20  0728)  Resp: 16 (01/29/20 0719)  BP: 130/62 (01/29/20 0719)  SpO2: (!) 93 % (01/29/20 0900) Vital Signs (24h Range):  Temp:  [97.6 °F (36.4 °C)-98.4 °F (36.9 °C)] 98.2 °F (36.8 °C)  Pulse:  [] 62  Resp:  [16-18] 16  SpO2:  [92 %-98 %] 93 %  BP: ()/(52-69) 130/62     Weight: 95.3 kg (210 lb 1.6 oz)  Body mass index is 36.06 kg/m².    SpO2: (!) 93 %  O2 Device (Oxygen Therapy): nasal cannula    Intake/Output - Last 3 Shifts       01/27 0700 - 01/28 0659 01/28 0700 - 01/29 0659 01/29 0700 - 01/30 0659    P.O. 0 0     Other  745     Total Intake(mL/kg) 0 (0) 745 (7.8)     Urine (mL/kg/hr)  0 (0)     Other  788     Total Output  788     Net 0 -43            Urine Occurrence  1 x           Lines/Drains/Airways     Central Venous Catheter Line                 Hemodialysis Catheter 01/27/20 1624 left subclavian 1 day          Peripheral Intravenous Line                 Midline Catheter Insertion/Assessment  - Single Lumen 01/21/20 1016 Right basilic vein (medial side of arm) 18g x 10cm 7 days                Physical Exam   Constitutional: She is oriented to person, place, and time. She appears well-developed and well-nourished. No distress.   HENT:   Head: Normocephalic and atraumatic.   Eyes: Pupils are equal, round, and reactive to light. EOM are normal.   Neck: Normal range of motion.   Cardiovascular: Normal rate, regular rhythm and normal pulses.   Pulmonary/Chest: Effort normal. No respiratory distress.   Abdominal: Soft.   Musculoskeletal: Normal range of motion. She exhibits no edema.   Neurological: She is alert and oriented to person, place, and time.   Skin: Skin is warm, dry and intact. Capillary refill takes less than 2 seconds. She is not diaphoretic. No erythema. No pallor.   Psychiatric: She has a normal mood and affect. Her speech is normal and behavior is normal. Judgment and thought content normal.   Vitals reviewed.      Significant Labs:  BMP:   Recent Labs   Lab 01/29/20  0806   *    *   K 4.4   CL 93*   CO2 24   BUN 31*   CREATININE 4.3*   CALCIUM 9.7   MG 2.1     CBC:   Recent Labs   Lab 01/29/20  0806   WBC 10.43   RBC 3.05*   HGB 9.0*   HCT 29.4*   *   MCV 96   MCH 29.5   MCHC 30.6*       Significant Diagnostics:  I have reviewed all pertinent imaging results/findings within the past 24 hours.   97

## 2024-04-22 NOTE — H&P PST ADULT - HISTORY OF PRESENT ILLNESS
This is a 64 y/o female with PMHX of GARRETT, hypothyroid who presents to PST with pre-operative diagnosis of deviated septum  Pt reports s/o nasal obstruction making it difficulty to use CPAP.  Otherwise pt feels well today and denies any acute symptoms.     having insiire placed, garrett  Patient is a 63 year old female with PMHs of GARRETT unable to tolerate CPAP and hypothyroid presents for preoperative testing for insertion of hypoglossal nerve neurostimulator electrode and generator and breathing sensor electrode with Dr. Coy Flores scheduled for 04/29/2024. Reports having DNS surgery to help with breathing, reports that she still snores, therefore opted for inspire placement. Denies any acute symptoms at this time. Otherwise, patient states feeling well overall.

## 2024-04-22 NOTE — H&P PST ADULT - RESPIRATORY
clear to auscultation bilaterally clear to auscultation bilaterally/no wheezes/airway patent/breath sounds equal/good air movement

## 2024-04-24 ENCOUNTER — TRANSCRIPTION ENCOUNTER (OUTPATIENT)
Age: 64
End: 2024-04-24

## 2024-04-25 ENCOUNTER — TRANSCRIPTION ENCOUNTER (OUTPATIENT)
Age: 64
End: 2024-04-25

## 2024-04-28 ENCOUNTER — TRANSCRIPTION ENCOUNTER (OUTPATIENT)
Age: 64
End: 2024-04-28

## 2024-04-29 ENCOUNTER — TRANSCRIPTION ENCOUNTER (OUTPATIENT)
Age: 64
End: 2024-04-29

## 2024-04-29 ENCOUNTER — OUTPATIENT (OUTPATIENT)
Dept: OUTPATIENT SERVICES | Facility: HOSPITAL | Age: 64
LOS: 1 days | End: 2024-04-29
Payer: COMMERCIAL

## 2024-04-29 ENCOUNTER — APPOINTMENT (OUTPATIENT)
Dept: OTOLARYNGOLOGY | Facility: HOSPITAL | Age: 64
End: 2024-04-29
Payer: COMMERCIAL

## 2024-04-29 ENCOUNTER — RESULT REVIEW (OUTPATIENT)
Age: 64
End: 2024-04-29

## 2024-04-29 VITALS
DIASTOLIC BLOOD PRESSURE: 85 MMHG | HEIGHT: 66 IN | WEIGHT: 199.3 LBS | RESPIRATION RATE: 14 BRPM | HEART RATE: 75 BPM | OXYGEN SATURATION: 97 % | SYSTOLIC BLOOD PRESSURE: 133 MMHG | TEMPERATURE: 98 F

## 2024-04-29 VITALS
OXYGEN SATURATION: 95 % | RESPIRATION RATE: 16 BRPM | SYSTOLIC BLOOD PRESSURE: 156 MMHG | DIASTOLIC BLOOD PRESSURE: 81 MMHG | HEART RATE: 98 BPM | TEMPERATURE: 97 F

## 2024-04-29 DIAGNOSIS — Z98.890 OTHER SPECIFIED POSTPROCEDURAL STATES: Chronic | ICD-10-CM

## 2024-04-29 DIAGNOSIS — Z98.891 HISTORY OF UTERINE SCAR FROM PREVIOUS SURGERY: Chronic | ICD-10-CM

## 2024-04-29 DIAGNOSIS — J34.2 DEVIATED NASAL SEPTUM: Chronic | ICD-10-CM

## 2024-04-29 DIAGNOSIS — G47.33 OBSTRUCTIVE SLEEP APNEA (ADULT) (PEDIATRIC): ICD-10-CM

## 2024-04-29 DIAGNOSIS — Z87.59 PERSONAL HISTORY OF OTHER COMPLICATIONS OF PREGNANCY, CHILDBIRTH AND THE PUERPERIUM: Chronic | ICD-10-CM

## 2024-04-29 PROCEDURE — 64582 OPN MPLTJ HPGLSL NSTM ARY PG: CPT | Mod: AS

## 2024-04-29 PROCEDURE — C1787: CPT

## 2024-04-29 PROCEDURE — C1767: CPT

## 2024-04-29 PROCEDURE — 71045 X-RAY EXAM CHEST 1 VIEW: CPT | Mod: 26

## 2024-04-29 PROCEDURE — ZZZZZ: CPT

## 2024-04-29 PROCEDURE — C1889: CPT

## 2024-04-29 PROCEDURE — C1778: CPT

## 2024-04-29 PROCEDURE — 71045 X-RAY EXAM CHEST 1 VIEW: CPT

## 2024-04-29 PROCEDURE — 64582 OPN MPLTJ HPGLSL NSTM ARY PG: CPT

## 2024-04-29 DEVICE — INSPIRE IMPLANT PULSE GENERATOR: Type: IMPLANTABLE DEVICE | Status: FUNCTIONAL

## 2024-04-29 DEVICE — INSPIRE RESPIRATORY SENSING LEAD: Type: IMPLANTABLE DEVICE | Status: FUNCTIONAL

## 2024-04-29 DEVICE — INSPIRE STIMULATION LEAD: Type: IMPLANTABLE DEVICE | Status: FUNCTIONAL

## 2024-04-29 DEVICE — SURGICEL 2 X 14": Type: IMPLANTABLE DEVICE | Status: FUNCTIONAL

## 2024-04-29 DEVICE — INSPIRE PATIENT REMOTE: Type: IMPLANTABLE DEVICE | Status: FUNCTIONAL

## 2024-04-29 RX ORDER — ONDANSETRON 8 MG/1
4 TABLET, FILM COATED ORAL ONCE
Refills: 0 | Status: DISCONTINUED | OUTPATIENT
Start: 2024-04-29 | End: 2024-04-29

## 2024-04-29 RX ORDER — HYDROMORPHONE HYDROCHLORIDE 2 MG/ML
0.5 INJECTION INTRAMUSCULAR; INTRAVENOUS; SUBCUTANEOUS ONCE
Refills: 0 | Status: DISCONTINUED | OUTPATIENT
Start: 2024-04-29 | End: 2024-04-29

## 2024-04-29 RX ORDER — RALOXIFENE HYDROCHLORIDE 60 MG/1
1 TABLET, COATED ORAL
Qty: 0 | Refills: 0 | DISCHARGE

## 2024-04-29 RX ORDER — DILTIAZEM HCL 120 MG
1 CAPSULE, EXT RELEASE 24 HR ORAL
Qty: 0 | Refills: 0 | DISCHARGE

## 2024-04-29 RX ORDER — LEVOTHYROXINE SODIUM 125 MCG
1 TABLET ORAL
Refills: 0 | DISCHARGE

## 2024-04-29 RX ORDER — SODIUM CHLORIDE 9 MG/ML
1000 INJECTION, SOLUTION INTRAVENOUS
Refills: 0 | Status: DISCONTINUED | OUTPATIENT
Start: 2024-04-29 | End: 2024-04-29

## 2024-04-29 RX ORDER — MORPHINE SULFATE 50 MG/1
2 CAPSULE, EXTENDED RELEASE ORAL
Refills: 0 | Status: DISCONTINUED | OUTPATIENT
Start: 2024-04-29 | End: 2024-04-29

## 2024-04-29 RX ORDER — ACETAMINOPHEN 500 MG
2 TABLET ORAL
Qty: 0 | Refills: 0 | DISCHARGE

## 2024-04-29 RX ORDER — OXYCODONE AND ACETAMINOPHEN 5; 325 MG/1; MG/1
1 TABLET ORAL ONCE
Refills: 0 | Status: DISCONTINUED | OUTPATIENT
Start: 2024-04-29 | End: 2024-04-29

## 2024-04-29 RX ORDER — LEVOTHYROXINE SODIUM 125 MCG
1 TABLET ORAL
Qty: 0 | Refills: 0 | DISCHARGE

## 2024-04-29 RX ORDER — OXYCODONE AND ACETAMINOPHEN 5; 325 MG/1; MG/1
1 TABLET ORAL
Qty: 12 | Refills: 0
Start: 2024-04-29 | End: 2024-05-01

## 2024-04-29 RX ORDER — CEPHALEXIN 500 MG
1 CAPSULE ORAL
Qty: 28 | Refills: 0
Start: 2024-04-29 | End: 2024-05-05

## 2024-04-29 RX ADMIN — SODIUM CHLORIDE 50 MILLILITER(S): 9 INJECTION, SOLUTION INTRAVENOUS at 09:40

## 2024-04-29 NOTE — BRIEF OPERATIVE NOTE - NSICDXBRIEFPROCEDURE_GEN_ALL_CORE_FT
PROCEDURES:  Insertion of hypoglossal nerve stimulator with electrode leads 29-Apr-2024 14:07:30  Arnaldo Sorensen

## 2024-04-29 NOTE — ASU PATIENT PROFILE, ADULT - SURGICAL SITE INCISION
Physical Therapy     Referred by: Ronnie Munoz MD; Medical Diagnosis (from order):    Diagnosis Information      Diagnosis    V45.4 (ICD-9-CM) - Z98.1 (ICD-10-CM) - S/P spinal fusion                Daily Treatment Note    Visit:  6   Diagnosis Precautions: ISAK    SUBJECTIVE                                                                                                               Has been trying to move more, \"the more I move the better I'll get\". Has been trying to think about movements and walking at home, trying to be careful, especially in the garden. Continues his HEP and going to the fitness center.   Pain / Symptoms:  Pain/symptom is: intermittent  Pain rating (out of 10): Current: 0     OBJECTIVE                                                                                                                     Observation:   Cervical: forward head  Spine: increased thoracic kyphosis  Seated Posture: fair  Standing Posture: poor  Observed Gait:     Amb with signifcant anterior trunk lean, decreased cadenced, decrease arm swing   Foot clearance has improved about 50% of the time        TREATMENT                                                                                                                  Therapeutic Exercise:  Ellipitical x 3 min with 3 rest breaks required // very difficult and tiring  Standing lumbar extension 2 x 10  TrA activation in hooklying 15 x 4 sec hold // difficulty coordinating, cues to retract abs  TrA with supine marches 2 x 10  Heel/Toe raises in sitting  SB marches in sitting 3 x 30 // mild difficulty with balance  Standing marches with high knees in parallel bars    Therapeutic Activity:  Gait training:  Stepping over obstacles  Increasing stride length and foot clearance: high knee walking  Increasing trunk extension     Neuromuscular Re-Education:  Not today:  Tandem stance on airex 2 x 2 min  Amb on airex pads with step ups // 3 instances of catching toes, no  LOB  Tiltboard - A/P and f/b taps  Tiltboard - balance x 2 min ea direction    Skilled input: tactile instruction/cues, posture correction, facilitation, verbal instruction/cues and as detailed above    Home Exercise Program/Education Materials: Access Code: [email or web address]  URL: [email or web address]  Date: 08/24/2021  Prepared by: Kayla White    Exercises  Hip Flexor Stretch at Pool Wall - 3 x daily - 7 x weekly - 3 sets - 10 reps - 20 hold  Single Leg Stance on Pool Noodle - 3 x daily - 7 x weekly - 3 sets - 10 reps - 2 hold  Standing March at Pool Wall - 3 x daily - 7 x weekly - 3 sets - 10 reps - 2 hold  Supine Bridge - 3 x daily - 7 x weekly - 3 sets - 10 reps - 2 hold  Clamshell - 3 x daily - 7 x weekly - 3 sets - 10 reps - 2 hold  Standing Hip Flexor Stretch - 3 x daily - 7 x weekly - 1 sets - 10 reps - 30 hold  Supine Transversus Abdominis Bracing - Hands on Stomach - 1 x daily - 7 x weekly - 2 sets - 10 reps - 4 sec hold       ASSESSMENT                                                                                                             Focus of today's session was core endurance, gait training, and balance training. Continues to require frequent vc for core activation during TherEx and balance exercises. Attempted ellipitical for stride cues and endurance, pt only able to tolerate 3 min with rest breaks every min. Pt responded well to seated and standing marches for foot clearance and balance, and demonstrated improved trunk extension in // bars. Educated to continue core exercises at home to help with posture of his trunk.   Pain/symptoms after session (out of 10): 1    Patient Education:   Results of above outlined education: Verbalizes understanding and Needs reinforcement      PLAN                                                                                                                           Suggestions for next session as indicated: Progress per plan of  care:  Gait training  Stair training  5xSTS      GOALS                                                                                                                           Long Term Goals: to be met by end of plan of care  1. Patient will ambulate safely and timely across the street with normalized gait pattern and heel-toe gait.  Status: progressing/ongoing  2. Patient will complete 5xSTS with one UE support in <15 sec to be able to perform sit-stand transfer and car transfer for completion of self-care tasks such as driving self to appointments  3. Patient will ascend and descend at least 10 steps, with one hand rail and using reciprocal pattern for travel to medical appointments  4. Patient will be independent with progressed and modified home exercise program.      Therapy procedure time and total treatment time can be found documented on the Time Entry flowsheet   no

## 2024-05-07 ENCOUNTER — TRANSCRIPTION ENCOUNTER (OUTPATIENT)
Age: 64
End: 2024-05-07

## 2024-05-08 ENCOUNTER — APPOINTMENT (OUTPATIENT)
Dept: OTOLARYNGOLOGY | Facility: CLINIC | Age: 64
End: 2024-05-08
Payer: COMMERCIAL

## 2024-05-08 PROCEDURE — 99024 POSTOP FOLLOW-UP VISIT: CPT

## 2024-05-10 ENCOUNTER — APPOINTMENT (OUTPATIENT)
Dept: CARDIOLOGY | Facility: CLINIC | Age: 64
End: 2024-05-10

## 2024-05-14 NOTE — PHYSICAL EXAM
[de-identified] : neck and  chest incision clean dry intact no edema no erythema no exudates two dressing removed.

## 2024-05-14 NOTE — HISTORY OF PRESENT ILLNESS
[de-identified] : pt healing well. Pt states there is little  to no pain, denies drainage or swelling at incision. Pt admits to restricting arm for 5 days.

## 2024-05-15 ENCOUNTER — APPOINTMENT (OUTPATIENT)
Dept: CARDIOLOGY | Facility: CLINIC | Age: 64
End: 2024-05-15
Payer: COMMERCIAL

## 2024-05-15 PROCEDURE — G2211 COMPLEX E/M VISIT ADD ON: CPT

## 2024-05-15 PROCEDURE — 99215 OFFICE O/P EST HI 40 MIN: CPT

## 2024-06-12 ENCOUNTER — APPOINTMENT (OUTPATIENT)
Dept: PULMONOLOGY | Facility: CLINIC | Age: 64
End: 2024-06-12
Payer: COMMERCIAL

## 2024-06-12 VITALS
HEIGHT: 66 IN | TEMPERATURE: 98 F | OXYGEN SATURATION: 97 % | WEIGHT: 185 LBS | DIASTOLIC BLOOD PRESSURE: 89 MMHG | RESPIRATION RATE: 17 BRPM | HEART RATE: 90 BPM | BODY MASS INDEX: 29.73 KG/M2 | SYSTOLIC BLOOD PRESSURE: 147 MMHG

## 2024-06-12 DIAGNOSIS — G47.33 OBSTRUCTIVE SLEEP APNEA (ADULT) (PEDIATRIC): ICD-10-CM

## 2024-06-12 PROCEDURE — 99205 OFFICE O/P NEW HI 60 MIN: CPT | Mod: 25,GC

## 2024-06-12 PROCEDURE — 76536 US EXAM OF HEAD AND NECK: CPT

## 2024-06-12 PROCEDURE — 95976 ALYS SMPL CN NPGT PRGRMG: CPT

## 2024-06-12 RX ORDER — METHYLPREDNISOLONE 4 MG/1
4 TABLET ORAL
Qty: 1 | Refills: 0 | Status: DISCONTINUED | COMMUNITY
Start: 2024-01-22 | End: 2024-06-12

## 2024-06-12 RX ORDER — SEMAGLUTIDE 0.25 MG/.5ML
0.25 INJECTION, SOLUTION SUBCUTANEOUS
Qty: 1 | Refills: 1 | Status: DISCONTINUED | COMMUNITY
Start: 2023-12-27 | End: 2024-06-12

## 2024-06-13 PROBLEM — G47.33 OBSTRUCTIVE SLEEP APNEA, ADULT: Status: ACTIVE | Noted: 2024-06-13

## 2024-06-13 NOTE — ASSESSMENT
[FreeTextEntry1] : 63 yo F PMH hypothyroidism, breast polyp s/p tamoxifen, deviated septum, and GARRETT intolerant of CPAP presenting for inspire activation. Implanted 4/29/24.   1) GARRETT - Ultrasound of the tongue was performed in the office today. US examination of the hyoid bone showed good anterior motion on a voltage of 1.4V at an electrode configuration of +-+.  Impedance and sensing diagnostics were performed in the office today and were within normal limits. The device appears to be functioning well. The Inspire device was activated at the following settings:  Inspire Settings: Sensory threshold  1.3 V Functional threshold  1.4 V (tongue protrusion)   - Voltage: 0.9 - 1.9 V (1.1V) - Start delay: 50 mins - Pause time: 30 mins - Therapy duration: 7 hours - Pulse Width: 90 msec - Electrode: +-+ - Rate: 3 Hz  The patient was instructed on how to properly use the device in the office today. They were told to start using the device nightly, and to titrate up on the voltage by 1 level every 7 nights. The patient was also counseled on the procedure for scheduling an MRI with the device. Once they reach a level where they feel their sleep is better controlled, or maximum tolerable level is achieved, they will call the office and we will schedule an HST in order to determine the efficacy of the current settings.  2) Possible RLS - reports nocturnal leg movements that occur few times per week without significant disruption to sleep quality. Last ferritin in 2017 demonstrates normal stores of 108ng/mL. We will repeat iron studies.

## 2024-06-13 NOTE — PHYSICAL EXAM
[General Appearance - Well Developed] : well developed [General Appearance - Well Nourished] : well nourished [Neck Appearance] : the appearance of the neck was normal [] : no respiratory distress [Respiration, Rhythm And Depth] : normal respiratory rhythm and effort [Exaggerated Use Of Accessory Muscles For Inspiration] : no accessory muscle use [Abnormal Walk] : normal gait [Oriented To Time, Place, And Person] : oriented to person, place, and time [Impaired Insight] : insight and judgment were intact [FreeTextEntry1] : incision appears CDI

## 2024-06-13 NOTE — PROCEDURE
[FreeTextEntry1] : Ultrasound of the tongue was performed in the office today. US examination of the hyoid bone showed good anterior motion on a voltage of 1.4V at an electrode configuration of +-+.

## 2024-06-13 NOTE — REVIEW OF SYSTEMS
[Fatigue] : fatigue [Difficulty Maintaining Sleep] : difficulty maintaining sleep [EDS: ESS=____] : no daytime somnolence [Difficulty Initiating Sleep] : no difficulty falling asleep

## 2024-06-13 NOTE — HISTORY OF PRESENT ILLNESS
[Snoring] : snoring [Witnessed Apneas] : witnessed sleep apnea [Frequent Nocturnal Awakening] : frequent nocturnal awakening [Awakes Unrefreshed] : awakening unrefreshed [Awakes with Headache] : headache upon awakening [DMS] : DMS [Lower Extremity Discomfort] : lower extremity discomfort in evening or at bedtime [Obstructive Sleep Apnea] : obstructive sleep apnea [FreeTextEntry1] : 63 yo F PMH hypothyroidism, breast polyp s/p tamoxifen, deviated septum, and GARRETT intolerant of CPAP presenting for inspire activation.   Patient previously seen by Dr. Kelly for symptoms of snoring, daytime fatigue, witnessed apneas. Underwent HST 7/21/23 with evidence of severe GARRETT (AHI 49.6/hr T90 12%). Subsequently underwent APAP titration though unable to tolerate due to claustrophobia and skin irritation. Referred to Dr. Flores and underwent inspire implantation 4/29/24. Reports continued lisp post procedure. Started on wegovy last month and lost approximately 17 pounds. Reports sensation of leg restlessness at night that has been ongoing for years. Occurs around 7 or later. Denies awakening at night from limb movements.   Goes to bed: 730pm Falls asleep within: 10mins Gets out of bed: 430-5am Nocturnal awakenings: 3+ Naps: No significant  ____________________________________________________________________ EPWORTH SLEEPINESS SCALE   How likely are you to doze off or fall asleep in the situations described below, in contrast to feeling just tired? This refers to your usual way of life in recent times. Even if you haven't done some of these things recently, try to work out how they would have affected you. Use the following scale to choose one most appropriate number for each situation.   Chance of dozing.............Situation 3........................................Sitting and reading 3........................................Watching TV 0........................................Sitting inactive in a public place (eg a theatre or a meeting) 1........................................As a passenger in a car for an hour without a break 1........................................Lying down to rest in the afternoon when circumstances permit 0........................................Sitting and talking to someone 0........................................Sitting quietly after lunch without alcohol 0........................................In a car, while stopped for a few minutes in traffic   8........................................TOTAL SCORE   0 = Never would doze 1 = Slight chance of dozing 2 = Moderate chance of dozing 3 = High chance of dozing ______________________________________________________________________   [DIS] : no DIS

## 2024-06-18 RX ORDER — SEMAGLUTIDE 1 MG/.5ML
1 INJECTION, SOLUTION SUBCUTANEOUS
Qty: 1 | Refills: 0 | Status: ACTIVE | COMMUNITY
Start: 2024-04-19 | End: 1900-01-01

## 2024-07-02 ENCOUNTER — APPOINTMENT (OUTPATIENT)
Dept: CARDIOLOGY | Facility: CLINIC | Age: 64
End: 2024-07-02
Payer: COMMERCIAL

## 2024-07-02 DIAGNOSIS — E66.01 MORBID (SEVERE) OBESITY DUE TO EXCESS CALORIES: ICD-10-CM

## 2024-07-02 PROBLEM — E78.5 HYPERLIPIDEMIA, MILD: Status: ACTIVE | Noted: 2018-10-31

## 2024-07-02 PROBLEM — G47.33 OSA (OBSTRUCTIVE SLEEP APNEA): Status: ACTIVE | Noted: 2023-07-27

## 2024-07-02 LAB
FERRITIN SERPL-MCNC: 72 NG/ML
IRON SATN MFR SERPL: 16 %
IRON SERPL-MCNC: 50 UG/DL
TIBC SERPL-MCNC: 318 UG/DL
TRANSFERRIN SERPL-MCNC: 267 MG/DL
UIBC SERPL-MCNC: 268 UG/DL

## 2024-07-02 PROCEDURE — 99214 OFFICE O/P EST MOD 30 MIN: CPT

## 2024-07-02 PROCEDURE — G2211 COMPLEX E/M VISIT ADD ON: CPT

## 2024-07-03 ENCOUNTER — APPOINTMENT (OUTPATIENT)
Dept: CARDIOLOGY | Facility: CLINIC | Age: 64
End: 2024-07-03
Payer: COMMERCIAL

## 2024-07-03 VITALS
HEIGHT: 66 IN | SYSTOLIC BLOOD PRESSURE: 158 MMHG | OXYGEN SATURATION: 98 % | BODY MASS INDEX: 28.77 KG/M2 | HEART RATE: 90 BPM | WEIGHT: 179 LBS | DIASTOLIC BLOOD PRESSURE: 86 MMHG

## 2024-07-03 DIAGNOSIS — E78.5 HYPERLIPIDEMIA, UNSPECIFIED: ICD-10-CM

## 2024-07-03 PROCEDURE — 99215 OFFICE O/P EST HI 40 MIN: CPT

## 2024-07-03 PROCEDURE — G2211 COMPLEX E/M VISIT ADD ON: CPT

## 2024-07-12 ENCOUNTER — APPOINTMENT (OUTPATIENT)
Dept: OBGYN | Facility: CLINIC | Age: 64
End: 2024-07-12
Payer: COMMERCIAL

## 2024-07-12 ENCOUNTER — APPOINTMENT (OUTPATIENT)
Dept: PULMONOLOGY | Facility: CLINIC | Age: 64
End: 2024-07-12
Payer: COMMERCIAL

## 2024-07-12 VITALS
HEIGHT: 66 IN | OXYGEN SATURATION: 100 % | SYSTOLIC BLOOD PRESSURE: 165 MMHG | BODY MASS INDEX: 28.61 KG/M2 | HEART RATE: 78 BPM | TEMPERATURE: 98.3 F | DIASTOLIC BLOOD PRESSURE: 90 MMHG | WEIGHT: 178 LBS

## 2024-07-12 VITALS — SYSTOLIC BLOOD PRESSURE: 139 MMHG | DIASTOLIC BLOOD PRESSURE: 89 MMHG

## 2024-07-12 DIAGNOSIS — G47.33 OBSTRUCTIVE SLEEP APNEA (ADULT) (PEDIATRIC): ICD-10-CM

## 2024-07-12 DIAGNOSIS — R39.9 UNSPECIFIED SYMPTOMS AND SIGNS INVOLVING THE GENITOURINARY SYSTEM: ICD-10-CM

## 2024-07-12 PROCEDURE — 99213 OFFICE O/P EST LOW 20 MIN: CPT

## 2024-07-12 PROCEDURE — 99214 OFFICE O/P EST MOD 30 MIN: CPT

## 2024-07-12 RX ORDER — NITROFURANTOIN (MONOHYDRATE/MACROCRYSTALS) 25; 75 MG/1; MG/1
100 CAPSULE ORAL TWICE DAILY
Qty: 10 | Refills: 3 | Status: ACTIVE | COMMUNITY
Start: 2024-07-12 | End: 1900-01-01

## 2024-07-15 LAB — BACTERIA UR CULT: NORMAL

## 2024-07-17 ENCOUNTER — NON-APPOINTMENT (OUTPATIENT)
Age: 64
End: 2024-07-17

## 2024-07-18 ENCOUNTER — APPOINTMENT (OUTPATIENT)
Dept: CT IMAGING | Facility: CLINIC | Age: 64
End: 2024-07-18
Payer: COMMERCIAL

## 2024-07-18 ENCOUNTER — APPOINTMENT (OUTPATIENT)
Dept: RADIOLOGY | Facility: CLINIC | Age: 64
End: 2024-07-18
Payer: COMMERCIAL

## 2024-07-18 ENCOUNTER — OUTPATIENT (OUTPATIENT)
Dept: OUTPATIENT SERVICES | Facility: HOSPITAL | Age: 64
LOS: 1 days | End: 2024-07-18
Payer: COMMERCIAL

## 2024-07-18 DIAGNOSIS — Z87.59 PERSONAL HISTORY OF OTHER COMPLICATIONS OF PREGNANCY, CHILDBIRTH AND THE PUERPERIUM: Chronic | ICD-10-CM

## 2024-07-18 DIAGNOSIS — E78.5 HYPERLIPIDEMIA, UNSPECIFIED: ICD-10-CM

## 2024-07-18 DIAGNOSIS — R05.9 COUGH, UNSPECIFIED: ICD-10-CM

## 2024-07-18 DIAGNOSIS — Z98.890 OTHER SPECIFIED POSTPROCEDURAL STATES: Chronic | ICD-10-CM

## 2024-07-18 DIAGNOSIS — J34.2 DEVIATED NASAL SEPTUM: Chronic | ICD-10-CM

## 2024-07-18 DIAGNOSIS — Z98.891 HISTORY OF UTERINE SCAR FROM PREVIOUS SURGERY: Chronic | ICD-10-CM

## 2024-07-18 PROCEDURE — 75571 CT HRT W/O DYE W/CA TEST: CPT | Mod: 26

## 2024-07-18 PROCEDURE — 75571 CT HRT W/O DYE W/CA TEST: CPT

## 2024-07-18 PROCEDURE — 71046 X-RAY EXAM CHEST 2 VIEWS: CPT

## 2024-07-18 PROCEDURE — 71046 X-RAY EXAM CHEST 2 VIEWS: CPT | Mod: 26

## 2024-07-22 ENCOUNTER — TRANSCRIPTION ENCOUNTER (OUTPATIENT)
Age: 64
End: 2024-07-22

## 2024-07-24 ENCOUNTER — APPOINTMENT (OUTPATIENT)
Dept: SURGERY | Facility: CLINIC | Age: 64
End: 2024-07-24

## 2024-07-24 PROCEDURE — 97802 MEDICAL NUTRITION INDIV IN: CPT | Mod: 95

## 2024-08-07 ENCOUNTER — OUTPATIENT (OUTPATIENT)
Dept: OUTPATIENT SERVICES | Facility: HOSPITAL | Age: 64
LOS: 1 days | End: 2024-08-07
Payer: COMMERCIAL

## 2024-08-07 ENCOUNTER — APPOINTMENT (OUTPATIENT)
Dept: SLEEP CENTER | Facility: CLINIC | Age: 64
End: 2024-08-07

## 2024-08-07 DIAGNOSIS — Z98.890 OTHER SPECIFIED POSTPROCEDURAL STATES: Chronic | ICD-10-CM

## 2024-08-07 DIAGNOSIS — Z87.59 PERSONAL HISTORY OF OTHER COMPLICATIONS OF PREGNANCY, CHILDBIRTH AND THE PUERPERIUM: Chronic | ICD-10-CM

## 2024-08-07 DIAGNOSIS — J34.2 DEVIATED NASAL SEPTUM: Chronic | ICD-10-CM

## 2024-08-07 DIAGNOSIS — Z98.891 HISTORY OF UTERINE SCAR FROM PREVIOUS SURGERY: Chronic | ICD-10-CM

## 2024-08-07 PROCEDURE — 95800 SLP STDY UNATTENDED: CPT

## 2024-08-07 PROCEDURE — 95800 SLP STDY UNATTENDED: CPT | Mod: 26

## 2024-08-14 ENCOUNTER — APPOINTMENT (OUTPATIENT)
Dept: CARDIOLOGY | Facility: CLINIC | Age: 64
End: 2024-08-14
Payer: COMMERCIAL

## 2024-08-14 DIAGNOSIS — E66.01 MORBID (SEVERE) OBESITY DUE TO EXCESS CALORIES: ICD-10-CM

## 2024-08-14 DIAGNOSIS — E78.5 HYPERLIPIDEMIA, UNSPECIFIED: ICD-10-CM

## 2024-08-14 PROCEDURE — G2211 COMPLEX E/M VISIT ADD ON: CPT

## 2024-08-14 PROCEDURE — 99214 OFFICE O/P EST MOD 30 MIN: CPT

## 2024-08-15 DIAGNOSIS — G47.33 OBSTRUCTIVE SLEEP APNEA (ADULT) (PEDIATRIC): ICD-10-CM

## 2024-08-27 NOTE — ASU PATIENT PROFILE, ADULT - PATIENT KNOW
[facility]  Department of Obstetrics & Gynecology  [address] [place] [address]  [id number]  [phone]      8/27/2024     RE:  Chapito Montes, 1993            To Whom It May Concern:    Chapito Montes is a patient under my care. She is currently pregnant with a due date of 9/25/2024. She has a pregnancy-related medical condition and requires work accommodations. At this time she is able to work with the following restrictions:  1. A lifting restriction of no more than 20 pounds  2. The ability to utilize the restroom a minimum of one time every 1-2 hours due to advancing gestational age.       The above recommendations are included with the patient's permanent medical record. The Pregnant Workers Fairness Act is a federal law that went into effect on 6/27/2023 and specifically states reasonable accommodations should be made. 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    Sincerely,        Raeann Paul M.D.                           As a reminder, pregnant women are protected under the Pregnancy Discrimination Act and the Americans with Disabilities Act. https://www.eeoc.gov/eeoc/publications/pregnant_workers.cfm               
yes

## 2024-09-18 ENCOUNTER — APPOINTMENT (OUTPATIENT)
Dept: CARDIOLOGY | Facility: CLINIC | Age: 64
End: 2024-09-18
Payer: COMMERCIAL

## 2024-09-18 VITALS — WEIGHT: 168 LBS | BODY MASS INDEX: 27 KG/M2 | HEIGHT: 66 IN

## 2024-09-18 DIAGNOSIS — G47.33 OBSTRUCTIVE SLEEP APNEA (ADULT) (PEDIATRIC): ICD-10-CM

## 2024-09-18 DIAGNOSIS — E78.5 HYPERLIPIDEMIA, UNSPECIFIED: ICD-10-CM

## 2024-09-18 PROCEDURE — G2211 COMPLEX E/M VISIT ADD ON: CPT

## 2024-09-18 PROCEDURE — 99214 OFFICE O/P EST MOD 30 MIN: CPT

## 2024-09-19 ENCOUNTER — APPOINTMENT (OUTPATIENT)
Dept: ULTRASOUND IMAGING | Facility: IMAGING CENTER | Age: 64
End: 2024-09-19
Payer: COMMERCIAL

## 2024-09-19 ENCOUNTER — RESULT REVIEW (OUTPATIENT)
Age: 64
End: 2024-09-19

## 2024-09-19 ENCOUNTER — OUTPATIENT (OUTPATIENT)
Dept: OUTPATIENT SERVICES | Facility: HOSPITAL | Age: 64
LOS: 1 days | End: 2024-09-19
Payer: COMMERCIAL

## 2024-09-19 ENCOUNTER — APPOINTMENT (OUTPATIENT)
Dept: MAMMOGRAPHY | Facility: IMAGING CENTER | Age: 64
End: 2024-09-19
Payer: COMMERCIAL

## 2024-09-19 DIAGNOSIS — Z00.8 ENCOUNTER FOR OTHER GENERAL EXAMINATION: ICD-10-CM

## 2024-09-19 DIAGNOSIS — J34.2 DEVIATED NASAL SEPTUM: Chronic | ICD-10-CM

## 2024-09-19 DIAGNOSIS — Z98.890 OTHER SPECIFIED POSTPROCEDURAL STATES: Chronic | ICD-10-CM

## 2024-09-19 DIAGNOSIS — Z98.891 HISTORY OF UTERINE SCAR FROM PREVIOUS SURGERY: Chronic | ICD-10-CM

## 2024-09-19 DIAGNOSIS — N60.99 UNSPECIFIED BENIGN MAMMARY DYSPLASIA OF UNSPECIFIED BREAST: ICD-10-CM

## 2024-09-19 DIAGNOSIS — Z87.59 PERSONAL HISTORY OF OTHER COMPLICATIONS OF PREGNANCY, CHILDBIRTH AND THE PUERPERIUM: Chronic | ICD-10-CM

## 2024-09-19 PROCEDURE — 77067 SCR MAMMO BI INCL CAD: CPT | Mod: 26

## 2024-09-19 PROCEDURE — 76641 ULTRASOUND BREAST COMPLETE: CPT | Mod: 26,50

## 2024-09-19 PROCEDURE — 77063 BREAST TOMOSYNTHESIS BI: CPT

## 2024-09-19 PROCEDURE — 77063 BREAST TOMOSYNTHESIS BI: CPT | Mod: 26

## 2024-09-19 PROCEDURE — 76641 ULTRASOUND BREAST COMPLETE: CPT

## 2024-09-19 PROCEDURE — 77067 SCR MAMMO BI INCL CAD: CPT

## 2024-09-20 ENCOUNTER — RESULT REVIEW (OUTPATIENT)
Age: 64
End: 2024-09-20

## 2024-09-20 ENCOUNTER — OUTPATIENT (OUTPATIENT)
Dept: OUTPATIENT SERVICES | Facility: HOSPITAL | Age: 64
LOS: 1 days | End: 2024-09-20
Payer: COMMERCIAL

## 2024-09-20 ENCOUNTER — APPOINTMENT (OUTPATIENT)
Dept: MAMMOGRAPHY | Facility: IMAGING CENTER | Age: 64
End: 2024-09-20
Payer: COMMERCIAL

## 2024-09-20 DIAGNOSIS — Z98.890 OTHER SPECIFIED POSTPROCEDURAL STATES: Chronic | ICD-10-CM

## 2024-09-20 DIAGNOSIS — J34.2 DEVIATED NASAL SEPTUM: Chronic | ICD-10-CM

## 2024-09-20 DIAGNOSIS — Z98.891 HISTORY OF UTERINE SCAR FROM PREVIOUS SURGERY: Chronic | ICD-10-CM

## 2024-09-20 DIAGNOSIS — Z87.59 PERSONAL HISTORY OF OTHER COMPLICATIONS OF PREGNANCY, CHILDBIRTH AND THE PUERPERIUM: Chronic | ICD-10-CM

## 2024-09-20 DIAGNOSIS — Z00.8 ENCOUNTER FOR OTHER GENERAL EXAMINATION: ICD-10-CM

## 2024-09-20 DIAGNOSIS — R92.8 OTHER ABNORMAL AND INCONCLUSIVE FINDINGS ON DIAGNOSTIC IMAGING OF BREAST: ICD-10-CM

## 2024-09-20 PROCEDURE — G0279: CPT

## 2024-09-20 PROCEDURE — 77065 DX MAMMO INCL CAD UNI: CPT

## 2024-09-20 PROCEDURE — G0279: CPT | Mod: 26

## 2024-09-20 PROCEDURE — 77065 DX MAMMO INCL CAD UNI: CPT | Mod: 26,LT

## 2024-09-25 ENCOUNTER — OUTPATIENT (OUTPATIENT)
Dept: OUTPATIENT SERVICES | Facility: HOSPITAL | Age: 64
LOS: 1 days | End: 2024-09-25
Payer: COMMERCIAL

## 2024-09-25 ENCOUNTER — APPOINTMENT (OUTPATIENT)
Dept: SLEEP CENTER | Facility: CLINIC | Age: 64
End: 2024-09-25
Payer: COMMERCIAL

## 2024-09-25 DIAGNOSIS — Z87.59 PERSONAL HISTORY OF OTHER COMPLICATIONS OF PREGNANCY, CHILDBIRTH AND THE PUERPERIUM: Chronic | ICD-10-CM

## 2024-09-25 DIAGNOSIS — J34.2 DEVIATED NASAL SEPTUM: Chronic | ICD-10-CM

## 2024-09-25 DIAGNOSIS — Z98.890 OTHER SPECIFIED POSTPROCEDURAL STATES: Chronic | ICD-10-CM

## 2024-09-25 PROCEDURE — 95800 SLP STDY UNATTENDED: CPT | Mod: 26

## 2024-09-25 PROCEDURE — 95800 SLP STDY UNATTENDED: CPT

## 2024-09-26 ENCOUNTER — NON-APPOINTMENT (OUTPATIENT)
Age: 64
End: 2024-09-26

## 2024-10-02 DIAGNOSIS — G47.33 OBSTRUCTIVE SLEEP APNEA (ADULT) (PEDIATRIC): ICD-10-CM

## 2024-10-17 ENCOUNTER — APPOINTMENT (OUTPATIENT)
Dept: OBGYN | Facility: CLINIC | Age: 64
End: 2024-10-17
Payer: COMMERCIAL

## 2024-10-17 ENCOUNTER — ASOB RESULT (OUTPATIENT)
Age: 64
End: 2024-10-17

## 2024-10-17 PROCEDURE — 76857 US EXAM PELVIC LIMITED: CPT

## 2024-10-17 PROCEDURE — 76830 TRANSVAGINAL US NON-OB: CPT

## 2024-10-24 ENCOUNTER — NON-APPOINTMENT (OUTPATIENT)
Age: 64
End: 2024-10-24

## 2024-10-24 ENCOUNTER — APPOINTMENT (OUTPATIENT)
Dept: OBGYN | Facility: CLINIC | Age: 64
End: 2024-10-24
Payer: COMMERCIAL

## 2024-10-24 VITALS
HEIGHT: 66 IN | BODY MASS INDEX: 26.68 KG/M2 | DIASTOLIC BLOOD PRESSURE: 83 MMHG | SYSTOLIC BLOOD PRESSURE: 139 MMHG | WEIGHT: 166 LBS

## 2024-10-24 DIAGNOSIS — N90.5 ATROPHY OF VULVA: ICD-10-CM

## 2024-10-24 DIAGNOSIS — Z13.31 ENCOUNTER FOR SCREENING FOR DEPRESSION: ICD-10-CM

## 2024-10-24 DIAGNOSIS — Z01.411 ENCOUNTER FOR GYNECOLOGICAL EXAMINATION (GENERAL) (ROUTINE) WITH ABNORMAL FINDINGS: ICD-10-CM

## 2024-10-24 DIAGNOSIS — N95.2 POSTMENOPAUSAL ATROPHIC VAGINITIS: ICD-10-CM

## 2024-10-24 DIAGNOSIS — N60.99 UNSPECIFIED BENIGN MAMMARY DYSPLASIA OF UNSPECIFIED BREAST: ICD-10-CM

## 2024-10-24 PROCEDURE — 99397 PER PM REEVAL EST PAT 65+ YR: CPT

## 2024-10-24 PROCEDURE — 82270 OCCULT BLOOD FECES: CPT

## 2024-10-27 LAB — HPV HIGH+LOW RISK DNA PNL CVX: NOT DETECTED

## 2024-10-30 ENCOUNTER — APPOINTMENT (OUTPATIENT)
Dept: OTOLARYNGOLOGY | Facility: CLINIC | Age: 64
End: 2024-10-30
Payer: COMMERCIAL

## 2024-10-30 VITALS
HEIGHT: 66 IN | SYSTOLIC BLOOD PRESSURE: 157 MMHG | DIASTOLIC BLOOD PRESSURE: 84 MMHG | WEIGHT: 166 LBS | BODY MASS INDEX: 26.68 KG/M2 | HEART RATE: 91 BPM

## 2024-10-30 DIAGNOSIS — J31.0 CHRONIC RHINITIS: ICD-10-CM

## 2024-10-30 DIAGNOSIS — H93.13 TINNITUS, BILATERAL: ICD-10-CM

## 2024-10-30 DIAGNOSIS — R42 DIZZINESS AND GIDDINESS: ICD-10-CM

## 2024-10-30 DIAGNOSIS — G47.33 OBSTRUCTIVE SLEEP APNEA (ADULT) (PEDIATRIC): ICD-10-CM

## 2024-10-30 DIAGNOSIS — J34.2 DEVIATED NASAL SEPTUM: ICD-10-CM

## 2024-10-30 DIAGNOSIS — J02.8 ACUTE PHARYNGITIS DUE TO OTHER SPECIFIED ORGANISMS: ICD-10-CM

## 2024-10-30 DIAGNOSIS — R05.9 COUGH, UNSPECIFIED: ICD-10-CM

## 2024-10-30 DIAGNOSIS — H90.3 SENSORINEURAL HEARING LOSS, BILATERAL: ICD-10-CM

## 2024-10-30 DIAGNOSIS — H92.01 OTALGIA, RIGHT EAR: ICD-10-CM

## 2024-10-30 DIAGNOSIS — R59.0 LOCALIZED ENLARGED LYMPH NODES: ICD-10-CM

## 2024-10-30 DIAGNOSIS — R05.3 CHRONIC COUGH: ICD-10-CM

## 2024-10-30 DIAGNOSIS — Z80.7 FAMILY HISTORY OF OTHER MALIGNANT NEOPLASMS OF LYMPHOID, HEMATOPOIETIC AND RELATED TISSUES: ICD-10-CM

## 2024-10-30 DIAGNOSIS — H90.5 UNSPECIFIED SENSORINEURAL HEARING LOSS: ICD-10-CM

## 2024-10-30 LAB — CYTOLOGY CVX/VAG DOC THIN PREP: NORMAL

## 2024-10-30 PROCEDURE — 99214 OFFICE O/P EST MOD 30 MIN: CPT | Mod: 25

## 2024-10-31 RX ORDER — ESTRADIOL 0.1 MG/G
0.1 CREAM VAGINAL
Qty: 1 | Refills: 1 | Status: ACTIVE | COMMUNITY
Start: 2024-10-24 | End: 1900-01-01

## 2024-11-13 ENCOUNTER — APPOINTMENT (OUTPATIENT)
Dept: CARDIOLOGY | Facility: CLINIC | Age: 64
End: 2024-11-13
Payer: COMMERCIAL

## 2024-11-13 VITALS — BODY MASS INDEX: 26.36 KG/M2 | HEIGHT: 66 IN | WEIGHT: 164 LBS

## 2024-11-13 DIAGNOSIS — E78.5 HYPERLIPIDEMIA, UNSPECIFIED: ICD-10-CM

## 2024-11-13 DIAGNOSIS — E66.01 MORBID (SEVERE) OBESITY DUE TO EXCESS CALORIES: ICD-10-CM

## 2024-11-13 PROCEDURE — 99214 OFFICE O/P EST MOD 30 MIN: CPT

## 2024-11-13 PROCEDURE — G2211 COMPLEX E/M VISIT ADD ON: CPT

## 2024-11-25 ENCOUNTER — APPOINTMENT (OUTPATIENT)
Dept: PULMONOLOGY | Facility: CLINIC | Age: 64
End: 2024-11-25
Payer: COMMERCIAL

## 2024-11-25 VITALS
SYSTOLIC BLOOD PRESSURE: 154 MMHG | WEIGHT: 163 LBS | DIASTOLIC BLOOD PRESSURE: 74 MMHG | OXYGEN SATURATION: 97 % | BODY MASS INDEX: 26.2 KG/M2 | HEART RATE: 87 BPM | HEIGHT: 66 IN

## 2024-11-25 DIAGNOSIS — G47.33 OBSTRUCTIVE SLEEP APNEA (ADULT) (PEDIATRIC): ICD-10-CM

## 2024-11-25 PROCEDURE — 99205 OFFICE O/P NEW HI 60 MIN: CPT

## 2024-11-25 PROCEDURE — 95976 ALYS SMPL CN NPGT PRGRMG: CPT

## 2024-12-18 ENCOUNTER — TRANSCRIPTION ENCOUNTER (OUTPATIENT)
Age: 64
End: 2024-12-18

## 2025-01-14 ENCOUNTER — RX RENEWAL (OUTPATIENT)
Age: 65
End: 2025-01-14

## 2025-01-22 ENCOUNTER — APPOINTMENT (OUTPATIENT)
Dept: CARDIOLOGY | Facility: CLINIC | Age: 65
End: 2025-01-22

## 2025-01-23 ENCOUNTER — APPOINTMENT (OUTPATIENT)
Dept: MAMMOGRAPHY | Facility: IMAGING CENTER | Age: 65
End: 2025-01-23
Payer: COMMERCIAL

## 2025-01-23 ENCOUNTER — RESULT REVIEW (OUTPATIENT)
Age: 65
End: 2025-01-23

## 2025-01-23 ENCOUNTER — OUTPATIENT (OUTPATIENT)
Dept: OUTPATIENT SERVICES | Facility: HOSPITAL | Age: 65
LOS: 1 days | End: 2025-01-23
Payer: COMMERCIAL

## 2025-01-23 DIAGNOSIS — R92.8 OTHER ABNORMAL AND INCONCLUSIVE FINDINGS ON DIAGNOSTIC IMAGING OF BREAST: ICD-10-CM

## 2025-01-23 DIAGNOSIS — Z98.890 OTHER SPECIFIED POSTPROCEDURAL STATES: Chronic | ICD-10-CM

## 2025-01-23 DIAGNOSIS — Z98.891 HISTORY OF UTERINE SCAR FROM PREVIOUS SURGERY: Chronic | ICD-10-CM

## 2025-01-23 DIAGNOSIS — Z87.59 PERSONAL HISTORY OF OTHER COMPLICATIONS OF PREGNANCY, CHILDBIRTH AND THE PUERPERIUM: Chronic | ICD-10-CM

## 2025-01-23 DIAGNOSIS — J34.2 DEVIATED NASAL SEPTUM: Chronic | ICD-10-CM

## 2025-01-23 PROCEDURE — G0279: CPT | Mod: 26

## 2025-01-23 PROCEDURE — 77065 DX MAMMO INCL CAD UNI: CPT | Mod: 26,LT

## 2025-01-23 PROCEDURE — G0279: CPT

## 2025-01-23 PROCEDURE — 77065 DX MAMMO INCL CAD UNI: CPT

## 2025-01-24 ENCOUNTER — TRANSCRIPTION ENCOUNTER (OUTPATIENT)
Age: 65
End: 2025-01-24

## 2025-01-31 ENCOUNTER — APPOINTMENT (OUTPATIENT)
Dept: MRI IMAGING | Facility: IMAGING CENTER | Age: 65
End: 2025-01-31
Payer: COMMERCIAL

## 2025-01-31 ENCOUNTER — APPOINTMENT (OUTPATIENT)
Dept: MAMMOGRAPHY | Facility: IMAGING CENTER | Age: 65
End: 2025-01-31
Payer: COMMERCIAL

## 2025-01-31 ENCOUNTER — OUTPATIENT (OUTPATIENT)
Dept: OUTPATIENT SERVICES | Facility: HOSPITAL | Age: 65
LOS: 1 days | End: 2025-01-31
Payer: COMMERCIAL

## 2025-01-31 DIAGNOSIS — J34.2 DEVIATED NASAL SEPTUM: Chronic | ICD-10-CM

## 2025-01-31 DIAGNOSIS — Z98.890 OTHER SPECIFIED POSTPROCEDURAL STATES: Chronic | ICD-10-CM

## 2025-01-31 DIAGNOSIS — Z98.891 HISTORY OF UTERINE SCAR FROM PREVIOUS SURGERY: Chronic | ICD-10-CM

## 2025-01-31 DIAGNOSIS — Z87.59 PERSONAL HISTORY OF OTHER COMPLICATIONS OF PREGNANCY, CHILDBIRTH AND THE PUERPERIUM: Chronic | ICD-10-CM

## 2025-01-31 DIAGNOSIS — Z91.89 OTHER SPECIFIED PERSONAL RISK FACTORS, NOT ELSEWHERE CLASSIFIED: ICD-10-CM

## 2025-01-31 PROCEDURE — G0279: CPT

## 2025-01-31 PROCEDURE — G0279: CPT | Mod: 26

## 2025-01-31 PROCEDURE — 77066 DX MAMMO INCL CAD BI: CPT | Mod: 26

## 2025-01-31 PROCEDURE — 77066 DX MAMMO INCL CAD BI: CPT

## 2025-02-07 ENCOUNTER — TRANSCRIPTION ENCOUNTER (OUTPATIENT)
Age: 65
End: 2025-02-07

## 2025-02-12 ENCOUNTER — APPOINTMENT (OUTPATIENT)
Dept: CARDIOLOGY | Facility: CLINIC | Age: 65
End: 2025-02-12
Payer: COMMERCIAL

## 2025-02-12 DIAGNOSIS — E78.5 HYPERLIPIDEMIA, UNSPECIFIED: ICD-10-CM

## 2025-02-12 DIAGNOSIS — E66.01 MORBID (SEVERE) OBESITY DUE TO EXCESS CALORIES: ICD-10-CM

## 2025-02-12 PROCEDURE — 99214 OFFICE O/P EST MOD 30 MIN: CPT | Mod: 95

## 2025-02-12 PROCEDURE — G2211 COMPLEX E/M VISIT ADD ON: CPT | Mod: 95

## 2025-02-12 RX ORDER — TIRZEPATIDE 2.5 MG/.5ML
2.5 INJECTION, SOLUTION SUBCUTANEOUS
Qty: 1 | Refills: 0 | Status: ACTIVE | COMMUNITY
Start: 2025-02-12 | End: 1900-01-01

## 2025-02-15 ENCOUNTER — NON-APPOINTMENT (OUTPATIENT)
Age: 65
End: 2025-02-15

## 2025-02-24 ENCOUNTER — TRANSCRIPTION ENCOUNTER (OUTPATIENT)
Age: 65
End: 2025-02-24

## 2025-03-07 ENCOUNTER — APPOINTMENT (OUTPATIENT)
Dept: INTERNAL MEDICINE | Facility: CLINIC | Age: 65
End: 2025-03-07

## 2025-03-07 ENCOUNTER — APPOINTMENT (OUTPATIENT)
Dept: INTERNAL MEDICINE | Facility: CLINIC | Age: 65
End: 2025-03-07
Payer: COMMERCIAL

## 2025-03-07 VITALS — DIASTOLIC BLOOD PRESSURE: 90 MMHG | SYSTOLIC BLOOD PRESSURE: 140 MMHG

## 2025-03-07 VITALS
WEIGHT: 164 LBS | DIASTOLIC BLOOD PRESSURE: 85 MMHG | SYSTOLIC BLOOD PRESSURE: 150 MMHG | BODY MASS INDEX: 26.36 KG/M2 | HEART RATE: 83 BPM | HEIGHT: 66 IN | OXYGEN SATURATION: 99 %

## 2025-03-07 DIAGNOSIS — E55.9 VITAMIN D DEFICIENCY, UNSPECIFIED: ICD-10-CM

## 2025-03-07 DIAGNOSIS — Z00.00 ENCOUNTER FOR GENERAL ADULT MEDICAL EXAMINATION W/OUT ABNORMAL FINDINGS: ICD-10-CM

## 2025-03-07 DIAGNOSIS — Z23 ENCOUNTER FOR IMMUNIZATION: ICD-10-CM

## 2025-03-07 DIAGNOSIS — E78.5 HYPERLIPIDEMIA, UNSPECIFIED: ICD-10-CM

## 2025-03-07 DIAGNOSIS — N60.99 UNSPECIFIED BENIGN MAMMARY DYSPLASIA OF UNSPECIFIED BREAST: ICD-10-CM

## 2025-03-07 DIAGNOSIS — E03.9 HYPOTHYROIDISM, UNSPECIFIED: ICD-10-CM

## 2025-03-07 DIAGNOSIS — D64.9 ANEMIA, UNSPECIFIED: ICD-10-CM

## 2025-03-07 DIAGNOSIS — G47.33 OBSTRUCTIVE SLEEP APNEA (ADULT) (PEDIATRIC): ICD-10-CM

## 2025-03-07 PROCEDURE — 99214 OFFICE O/P EST MOD 30 MIN: CPT | Mod: 25

## 2025-03-07 PROCEDURE — 99387 INIT PM E/M NEW PAT 65+ YRS: CPT

## 2025-03-07 PROCEDURE — 36415 COLL VENOUS BLD VENIPUNCTURE: CPT

## 2025-03-13 ENCOUNTER — TRANSCRIPTION ENCOUNTER (OUTPATIENT)
Age: 65
End: 2025-03-13

## 2025-03-13 ENCOUNTER — NON-APPOINTMENT (OUTPATIENT)
Age: 65
End: 2025-03-13

## 2025-03-13 LAB
25(OH)D3 SERPL-MCNC: 60.3 NG/ML
ALBUMIN SERPL ELPH-MCNC: 4.5 G/DL
ALP BLD-CCNC: 106 U/L
ALT SERPL-CCNC: 26 U/L
ANION GAP SERPL CALC-SCNC: 12 MMOL/L
AST SERPL-CCNC: 17 U/L
BASOPHILS # BLD AUTO: 0.05 K/UL
BASOPHILS NFR BLD AUTO: 0.6 %
BILIRUB SERPL-MCNC: 0.5 MG/DL
BUN SERPL-MCNC: 13 MG/DL
CALCIUM SERPL-MCNC: 9.3 MG/DL
CHLORIDE SERPL-SCNC: 101 MMOL/L
CHOLEST SERPL-MCNC: 238 MG/DL
CO2 SERPL-SCNC: 25 MMOL/L
CREAT SERPL-MCNC: 0.56 MG/DL
EGFRCR SERPLBLD CKD-EPI 2021: 101 ML/MIN/1.73M2
EOSINOPHIL # BLD AUTO: 0.21 K/UL
EOSINOPHIL NFR BLD AUTO: 2.4 %
ESTIMATED AVERAGE GLUCOSE: 114 MG/DL
GLUCOSE SERPL-MCNC: 87 MG/DL
HBA1C MFR BLD HPLC: 5.6 %
HCT VFR BLD CALC: 45.3 %
HCV AB SER QL: NONREACTIVE
HCV S/CO RATIO: 0.09 S/CO
HDLC SERPL-MCNC: 96 MG/DL
HGB BLD-MCNC: 14 G/DL
HIV1+2 AB SPEC QL IA.RAPID: NONREACTIVE
IMM GRANULOCYTES NFR BLD AUTO: 0.2 %
LDLC SERPL CALC-MCNC: 121 MG/DL
LYMPHOCYTES # BLD AUTO: 2.73 K/UL
LYMPHOCYTES NFR BLD AUTO: 31.6 %
MAN DIFF?: NORMAL
MCHC RBC-ENTMCNC: 27.3 PG
MCHC RBC-ENTMCNC: 30.9 G/DL
MCV RBC AUTO: 88.3 FL
MEV IGG FLD QL IA: 274 AU/ML
MEV IGG+IGM SER-IMP: POSITIVE
MONOCYTES # BLD AUTO: 0.54 K/UL
MONOCYTES NFR BLD AUTO: 6.3 %
MUV AB SER-ACNC: POSITIVE
MUV IGG SER QL IA: 71.9 AU/ML
NEUTROPHILS # BLD AUTO: 5.08 K/UL
NEUTROPHILS NFR BLD AUTO: 58.9 %
NONHDLC SERPL-MCNC: 142 MG/DL
PLATELET # BLD AUTO: 299 K/UL
POTASSIUM SERPL-SCNC: 4.5 MMOL/L
PROT SERPL-MCNC: 6.9 G/DL
RBC # BLD: 5.13 M/UL
RBC # FLD: 14.1 %
RUBV IGG FLD-ACNC: 1.82 INDEX
RUBV IGG SER-IMP: POSITIVE
SODIUM SERPL-SCNC: 138 MMOL/L
TRIGL SERPL-MCNC: 121 MG/DL
TSH SERPL-ACNC: 0.27 UIU/ML
WBC # FLD AUTO: 8.63 K/UL

## 2025-03-18 ENCOUNTER — NON-APPOINTMENT (OUTPATIENT)
Age: 65
End: 2025-03-18

## 2025-03-26 ENCOUNTER — APPOINTMENT (OUTPATIENT)
Dept: SURGICAL ONCOLOGY | Facility: CLINIC | Age: 65
End: 2025-03-26
Payer: COMMERCIAL

## 2025-03-26 DIAGNOSIS — N60.99 UNSPECIFIED BENIGN MAMMARY DYSPLASIA OF UNSPECIFIED BREAST: ICD-10-CM

## 2025-03-26 PROCEDURE — 99214 OFFICE O/P EST MOD 30 MIN: CPT

## 2025-04-02 ENCOUNTER — APPOINTMENT (OUTPATIENT)
Dept: CARDIOLOGY | Facility: CLINIC | Age: 65
End: 2025-04-02
Payer: COMMERCIAL

## 2025-04-02 DIAGNOSIS — E78.5 HYPERLIPIDEMIA, UNSPECIFIED: ICD-10-CM

## 2025-04-02 DIAGNOSIS — E66.01 MORBID (SEVERE) OBESITY DUE TO EXCESS CALORIES: ICD-10-CM

## 2025-04-02 PROCEDURE — 99214 OFFICE O/P EST MOD 30 MIN: CPT | Mod: 95

## 2025-04-02 PROCEDURE — G2211 COMPLEX E/M VISIT ADD ON: CPT | Mod: 95

## 2025-05-21 ENCOUNTER — APPOINTMENT (OUTPATIENT)
Dept: CARDIOLOGY | Facility: CLINIC | Age: 65
End: 2025-05-21
Payer: COMMERCIAL

## 2025-05-21 DIAGNOSIS — E78.5 HYPERLIPIDEMIA, UNSPECIFIED: ICD-10-CM

## 2025-05-21 DIAGNOSIS — E66.01 MORBID (SEVERE) OBESITY DUE TO EXCESS CALORIES: ICD-10-CM

## 2025-05-21 PROCEDURE — G2211 COMPLEX E/M VISIT ADD ON: CPT | Mod: 95

## 2025-05-21 PROCEDURE — 99213 OFFICE O/P EST LOW 20 MIN: CPT | Mod: 95

## 2025-05-22 ENCOUNTER — APPOINTMENT (OUTPATIENT)
Dept: OTOLARYNGOLOGY | Facility: CLINIC | Age: 65
End: 2025-05-22
Payer: COMMERCIAL

## 2025-05-22 VITALS — BODY MASS INDEX: 26.36 KG/M2 | HEIGHT: 66 IN | WEIGHT: 164 LBS

## 2025-05-22 DIAGNOSIS — H93.13 TINNITUS, BILATERAL: ICD-10-CM

## 2025-05-22 DIAGNOSIS — H90.3 SENSORINEURAL HEARING LOSS, BILATERAL: ICD-10-CM

## 2025-05-22 PROCEDURE — 92504 EAR MICROSCOPY EXAMINATION: CPT

## 2025-05-22 PROCEDURE — 92557 COMPREHENSIVE HEARING TEST: CPT

## 2025-05-22 PROCEDURE — 99214 OFFICE O/P EST MOD 30 MIN: CPT

## 2025-05-22 PROCEDURE — 92567 TYMPANOMETRY: CPT

## 2025-05-22 PROCEDURE — 92625 TINNITUS ASSESSMENT: CPT

## 2025-06-04 ENCOUNTER — APPOINTMENT (OUTPATIENT)
Dept: CARDIOLOGY | Facility: CLINIC | Age: 65
End: 2025-06-04
Payer: COMMERCIAL

## 2025-06-04 DIAGNOSIS — E66.01 MORBID (SEVERE) OBESITY DUE TO EXCESS CALORIES: ICD-10-CM

## 2025-06-04 DIAGNOSIS — E78.5 HYPERLIPIDEMIA, UNSPECIFIED: ICD-10-CM

## 2025-06-04 PROCEDURE — 99213 OFFICE O/P EST LOW 20 MIN: CPT | Mod: 95

## 2025-06-04 PROCEDURE — G2211 COMPLEX E/M VISIT ADD ON: CPT | Mod: 95

## 2025-06-13 ENCOUNTER — NON-APPOINTMENT (OUTPATIENT)
Age: 65
End: 2025-06-13

## 2025-06-13 ENCOUNTER — APPOINTMENT (OUTPATIENT)
Dept: DERMATOLOGY | Facility: CLINIC | Age: 65
End: 2025-06-13

## 2025-06-13 VITALS — WEIGHT: 164 LBS | BODY MASS INDEX: 26.36 KG/M2 | HEIGHT: 66 IN

## 2025-06-13 PROBLEM — L72.0 MILIA: Status: ACTIVE | Noted: 2025-06-13

## 2025-06-13 PROBLEM — D18.01 CHERRY ANGIOMA: Status: ACTIVE | Noted: 2025-06-13

## 2025-06-13 PROBLEM — L82.0 INFLAMED SEBORRHEIC KERATOSIS: Status: ACTIVE | Noted: 2025-06-13

## 2025-06-13 PROBLEM — D22.9 MULTIPLE NEVI: Status: ACTIVE | Noted: 2025-06-13

## 2025-06-13 PROCEDURE — 17110 DESTRUCTION B9 LES UP TO 14: CPT | Mod: 59

## 2025-06-13 PROCEDURE — 10040 EXTRACTION: CPT

## 2025-06-13 PROCEDURE — 99203 OFFICE O/P NEW LOW 30 MIN: CPT | Mod: 25

## 2025-06-28 ENCOUNTER — NON-APPOINTMENT (OUTPATIENT)
Age: 65
End: 2025-06-28

## 2025-06-30 ENCOUNTER — APPOINTMENT (OUTPATIENT)
Dept: CARDIOLOGY | Facility: CLINIC | Age: 65
End: 2025-06-30

## 2025-06-30 ENCOUNTER — APPOINTMENT (OUTPATIENT)
Dept: PHARMACY | Facility: CLINIC | Age: 65
End: 2025-06-30
Payer: SELF-PAY

## 2025-06-30 PROCEDURE — V5010 ASSESSMENT FOR HEARING AID: CPT | Mod: NC

## 2025-07-23 ENCOUNTER — APPOINTMENT (OUTPATIENT)
Dept: PHARMACY | Facility: CLINIC | Age: 65
End: 2025-07-23
Payer: SELF-PAY

## 2025-07-23 ENCOUNTER — APPOINTMENT (OUTPATIENT)
Dept: CARDIOLOGY | Facility: CLINIC | Age: 65
End: 2025-07-23
Payer: COMMERCIAL

## 2025-07-23 DIAGNOSIS — E66.01 MORBID (SEVERE) OBESITY DUE TO EXCESS CALORIES: ICD-10-CM

## 2025-07-23 DIAGNOSIS — E78.5 HYPERLIPIDEMIA, UNSPECIFIED: ICD-10-CM

## 2025-07-23 PROCEDURE — 99214 OFFICE O/P EST MOD 30 MIN: CPT | Mod: 95

## 2025-07-23 PROCEDURE — V5261C: CUSTOM

## 2025-07-23 PROCEDURE — G2211 COMPLEX E/M VISIT ADD ON: CPT | Mod: 95

## 2025-08-11 ENCOUNTER — APPOINTMENT (OUTPATIENT)
Dept: PHARMACY | Facility: CLINIC | Age: 65
End: 2025-08-11

## 2025-08-21 ENCOUNTER — APPOINTMENT (OUTPATIENT)
Dept: OTOLARYNGOLOGY | Facility: CLINIC | Age: 65
End: 2025-08-21

## 2025-09-03 ENCOUNTER — APPOINTMENT (OUTPATIENT)
Dept: PHARMACY | Facility: CLINIC | Age: 65
End: 2025-09-03

## 2025-09-03 ENCOUNTER — APPOINTMENT (OUTPATIENT)
Dept: CARDIOLOGY | Facility: CLINIC | Age: 65
End: 2025-09-03
Payer: COMMERCIAL

## 2025-09-03 DIAGNOSIS — E66.01 MORBID (SEVERE) OBESITY DUE TO EXCESS CALORIES: ICD-10-CM

## 2025-09-03 DIAGNOSIS — E78.5 HYPERLIPIDEMIA, UNSPECIFIED: ICD-10-CM

## 2025-09-03 DIAGNOSIS — G47.33 OBSTRUCTIVE SLEEP APNEA (ADULT) (PEDIATRIC): ICD-10-CM

## 2025-09-03 PROCEDURE — 99214 OFFICE O/P EST MOD 30 MIN: CPT | Mod: 95

## 2025-09-03 PROCEDURE — G2211 COMPLEX E/M VISIT ADD ON: CPT | Mod: 95

## 2025-09-04 ENCOUNTER — APPOINTMENT (OUTPATIENT)
Dept: MAMMOGRAPHY | Facility: IMAGING CENTER | Age: 65
End: 2025-09-04
Payer: COMMERCIAL

## 2025-09-04 ENCOUNTER — RESULT REVIEW (OUTPATIENT)
Age: 65
End: 2025-09-04

## 2025-09-04 ENCOUNTER — OUTPATIENT (OUTPATIENT)
Dept: OUTPATIENT SERVICES | Facility: HOSPITAL | Age: 65
LOS: 1 days | End: 2025-09-04
Payer: COMMERCIAL

## 2025-09-04 ENCOUNTER — APPOINTMENT (OUTPATIENT)
Dept: ULTRASOUND IMAGING | Facility: IMAGING CENTER | Age: 65
End: 2025-09-04
Payer: COMMERCIAL

## 2025-09-04 DIAGNOSIS — Z98.891 HISTORY OF UTERINE SCAR FROM PREVIOUS SURGERY: Chronic | ICD-10-CM

## 2025-09-04 DIAGNOSIS — Z00.8 ENCOUNTER FOR OTHER GENERAL EXAMINATION: ICD-10-CM

## 2025-09-04 DIAGNOSIS — N60.99 UNSPECIFIED BENIGN MAMMARY DYSPLASIA OF UNSPECIFIED BREAST: ICD-10-CM

## 2025-09-04 DIAGNOSIS — Z87.59 PERSONAL HISTORY OF OTHER COMPLICATIONS OF PREGNANCY, CHILDBIRTH AND THE PUERPERIUM: Chronic | ICD-10-CM

## 2025-09-04 DIAGNOSIS — Z98.890 OTHER SPECIFIED POSTPROCEDURAL STATES: Chronic | ICD-10-CM

## 2025-09-04 DIAGNOSIS — J34.2 DEVIATED NASAL SEPTUM: Chronic | ICD-10-CM

## 2025-09-04 PROCEDURE — 77066 DX MAMMO INCL CAD BI: CPT

## 2025-09-04 PROCEDURE — 76641 ULTRASOUND BREAST COMPLETE: CPT

## 2025-09-04 PROCEDURE — G0279: CPT

## 2025-09-04 PROCEDURE — G0279: CPT | Mod: 26

## 2025-09-04 PROCEDURE — 76641 ULTRASOUND BREAST COMPLETE: CPT | Mod: 26,50

## 2025-09-04 PROCEDURE — 77066 DX MAMMO INCL CAD BI: CPT | Mod: 26

## (undated) DEVICE — SUT SILK 2-0 30" PSL

## (undated) DEVICE — VESSEL LOOP MAXI-BLUE 0.120" X 16"

## (undated) DEVICE — PACK SMR

## (undated) DEVICE — SUT CHROMIC 4-0 18" G-2

## (undated) DEVICE — SUT SILK 3-0 18" TIES

## (undated) DEVICE — WARMING BLANKET LOWER ADULT

## (undated) DEVICE — DRSG DERMABOND 0.7ML

## (undated) DEVICE — MEDTRONIC CATHETER PASSER 38CM

## (undated) DEVICE — SUT PLAIN GUT FAST ABSORBING 5-0 PC-1

## (undated) DEVICE — SUT SILK 3-0 30" RB-1

## (undated) DEVICE — Device

## (undated) DEVICE — MARKING PEN DEVON DUAL TIP W RULER

## (undated) DEVICE — DRAPE TOWEL BLUE 17" X 24"

## (undated) DEVICE — DRAPE MICROSCOPE LEICA 26" X 150"

## (undated) DEVICE — CATH ANGIO 14G X 3.25"

## (undated) DEVICE — DRSG TEGADERM 2.5X3"

## (undated) DEVICE — SUT CHROMIC 3-0 27" SH

## (undated) DEVICE — MARKING PEN W RULER

## (undated) DEVICE — ELCTR STRYKER NEPTUNE SMOKE EVACUATION PENCIL (GREEN)

## (undated) DEVICE — SUT ETHILON 3-0 30" FS-1

## (undated) DEVICE — SUT CHROMIC 4-0 54" REEL

## (undated) DEVICE — VISITEC 4X4

## (undated) DEVICE — ELCTR BIPOLAR STIMULATOR PROBE SIDE-BY-SIDE

## (undated) DEVICE — SUT POLYSORB 3-0 30" V-20

## (undated) DEVICE — TUBING SUCTION NONCONDUCTIVE 6MM X 12FT

## (undated) DEVICE — CATH IV SAFE BC 18G X 1.88" (GREEN)

## (undated) DEVICE — CATH IV SAFE INSYTE 14G X 1.75" (ORANGE)

## (undated) DEVICE — FRAZIER SUCTION TIP 10FR

## (undated) DEVICE — ELCTR GROUNDING PAD ADULT COVIDIEN

## (undated) DEVICE — SUT SILK 2-0 12-18"

## (undated) DEVICE — DRSG STERISTRIPS 0.5 X 4"

## (undated) DEVICE — SUT SILK 2-0 30" SH

## (undated) DEVICE — SUT VICRYL 4-0 27" SH UNDYED

## (undated) DEVICE — DRAIN SILICONE ROUND 9FR

## (undated) DEVICE — DRAPE MAGNETIC INSTRUMENT MEDIUM

## (undated) DEVICE — BIPOLAR FORCEP SYMMETRY BAYONET STR 8.25" X 1.5MM (BLUE)

## (undated) DEVICE — SYR LUER LOK 50CC

## (undated) DEVICE — DRSG STERISTRIPS 0.25 X 3"

## (undated) DEVICE — SUT MONOCRYL PLUS 4-0 18" PS-2 UNDYED

## (undated) DEVICE — VENODYNE/SCD SLEEVE CALF MEDIUM

## (undated) DEVICE — SYR LUER LOK 5CC

## (undated) DEVICE — GLV 7.5 PROTEXIS (WHITE)

## (undated) DEVICE — LABELS BLANK W PEN

## (undated) DEVICE — SUCTION YANKAUER BULBOUS TIP NO VENT

## (undated) DEVICE — DRAPE SPLIT SHEET 77" X 108"

## (undated) DEVICE — SUT PLAIN GUT 4-0 18" SC-1

## (undated) DEVICE — ELCTR ENT BOVIE SUCTION 10FR 6"

## (undated) DEVICE — PACK UPPER BODY

## (undated) DEVICE — DRSG MEROCEL 2000 WITH STRING 8CM

## (undated) DEVICE — DRAPE PROBE COVER 5" X 96"

## (undated) DEVICE — SPONGE PEANUT AUTO COUNT

## (undated) DEVICE — PREP BETADINE SPONGE STICKS

## (undated) DEVICE — DRAPE IOBAN 13" X 13"

## (undated) DEVICE — SUT VICRYL 4-0 18" P-3 UNDYED

## (undated) DEVICE — STAPLER SKIN PROXIMATE

## (undated) DEVICE — DRSG SPLINT INTRA NASAL .5MM OVERSIZE THICK

## (undated) DEVICE — SYR LUER LOK 10CC

## (undated) DEVICE — POSITIONER STRAP ARMBOARD VELCRO TS-30

## (undated) DEVICE — PACKING GAUZE PLAIN 0.5"

## (undated) DEVICE — DRSG TEGADERM 4X4.75"

## (undated) DEVICE — SUT MONOCRYL 5-0 18" PS-2

## (undated) DEVICE — DRSG SPLINT NASAL THERMA MED

## (undated) DEVICE — SUT CHROMIC 4-0 27" SH-1